# Patient Record
Sex: FEMALE | Race: WHITE | NOT HISPANIC OR LATINO | Employment: FULL TIME | URBAN - METROPOLITAN AREA
[De-identification: names, ages, dates, MRNs, and addresses within clinical notes are randomized per-mention and may not be internally consistent; named-entity substitution may affect disease eponyms.]

---

## 2022-09-19 ENCOUNTER — OFFICE VISIT (OUTPATIENT)
Dept: FAMILY MEDICINE CLINIC | Facility: CLINIC | Age: 42
End: 2022-09-19

## 2022-09-19 VITALS
WEIGHT: 206 LBS | HEIGHT: 63 IN | OXYGEN SATURATION: 100 % | DIASTOLIC BLOOD PRESSURE: 78 MMHG | BODY MASS INDEX: 36.5 KG/M2 | SYSTOLIC BLOOD PRESSURE: 124 MMHG | HEART RATE: 77 BPM

## 2022-09-19 DIAGNOSIS — Z00.00 PREVENTATIVE HEALTH CARE: ICD-10-CM

## 2022-09-19 DIAGNOSIS — Z12.31 ENCOUNTER FOR SCREENING MAMMOGRAM FOR MALIGNANT NEOPLASM OF BREAST: ICD-10-CM

## 2022-09-19 DIAGNOSIS — Z90.722 STATUS POST BILATERAL OOPHORECTOMY: ICD-10-CM

## 2022-09-19 DIAGNOSIS — R53.82 CHRONIC FATIGUE: ICD-10-CM

## 2022-09-19 DIAGNOSIS — Z11.59 ENCOUNTER FOR HEPATITIS C SCREENING TEST FOR LOW RISK PATIENT: ICD-10-CM

## 2022-09-19 DIAGNOSIS — Z98.84 HISTORY OF BARIATRIC SURGERY: ICD-10-CM

## 2022-09-19 DIAGNOSIS — Z13.228 SCREENING FOR METABOLIC DISORDER: ICD-10-CM

## 2022-09-19 DIAGNOSIS — D50.9 IRON DEFICIENCY ANEMIA, UNSPECIFIED IRON DEFICIENCY ANEMIA TYPE: Primary | ICD-10-CM

## 2022-09-19 DIAGNOSIS — Z13.220 SCREENING, LIPID: ICD-10-CM

## 2022-09-19 DIAGNOSIS — F32.A ANXIETY AND DEPRESSION: ICD-10-CM

## 2022-09-19 DIAGNOSIS — F41.9 ANXIETY AND DEPRESSION: ICD-10-CM

## 2022-09-19 DIAGNOSIS — E66.01 MORBID (SEVERE) OBESITY DUE TO EXCESS CALORIES: ICD-10-CM

## 2022-09-19 DIAGNOSIS — E66.9 OBESITY, CLASS II, BMI 35-39.9: ICD-10-CM

## 2022-09-19 DIAGNOSIS — R73.09 ELEVATED GLUCOSE: ICD-10-CM

## 2022-09-19 PROBLEM — E66.812 OBESITY, CLASS II, BMI 35-39.9: Status: ACTIVE | Noted: 2022-07-05

## 2022-09-19 PROCEDURE — 99386 PREV VISIT NEW AGE 40-64: CPT | Performed by: NURSE PRACTITIONER

## 2022-09-19 PROCEDURE — 90471 IMMUNIZATION ADMIN: CPT | Performed by: NURSE PRACTITIONER

## 2022-09-19 PROCEDURE — 99213 OFFICE O/P EST LOW 20 MIN: CPT | Performed by: NURSE PRACTITIONER

## 2022-09-19 PROCEDURE — 90715 TDAP VACCINE 7 YRS/> IM: CPT | Performed by: NURSE PRACTITIONER

## 2022-09-19 RX ORDER — TOPIRAMATE 50 MG/1
50 TABLET, FILM COATED ORAL EVERY 24 HOURS
COMMUNITY
Start: 2022-09-02 | End: 2022-12-27 | Stop reason: SDUPTHER

## 2022-09-19 RX ORDER — BUPROPION HYDROCHLORIDE 300 MG/1
300 TABLET ORAL EVERY MORNING
Qty: 30 TABLET | Refills: 6 | Status: SHIPPED | OUTPATIENT
Start: 2022-09-19 | End: 2022-12-27 | Stop reason: SDUPTHER

## 2022-09-19 RX ORDER — CYANOCOBALAMIN 1000 UG/ML
1000 INJECTION, SOLUTION INTRAMUSCULAR; SUBCUTANEOUS
Qty: 31 ML | Refills: 2 | Status: SHIPPED | OUTPATIENT
Start: 2022-09-19 | End: 2022-12-18

## 2022-09-19 RX ORDER — CYANOCOBALAMIN 1000 UG/ML
1 INJECTION, SOLUTION INTRAMUSCULAR; SUBCUTANEOUS
COMMUNITY
End: 2022-09-19 | Stop reason: SDUPTHER

## 2022-09-19 RX ORDER — DIPHENOXYLATE HYDROCHLORIDE AND ATROPINE SULFATE 2.5; .025 MG/1; MG/1
1 TABLET ORAL
COMMUNITY

## 2022-09-19 RX ORDER — BUPROPION HYDROCHLORIDE 300 MG/1
300 TABLET ORAL
COMMUNITY
End: 2022-09-19 | Stop reason: SDUPTHER

## 2022-09-19 NOTE — PROGRESS NOTES
Subjective   Leatha Burkett is a 41 y.o. female.     History of Present Illness   New patient to this provider and practice. Here to establish primary care. New to [place] from [place]. Pt is due annual exam, labs, needs referrals.     Patient presents with chronic iron def anemia, worse post bariatric surgery (20 years ago). She has low ferritin. She has h.o iron infusions and states she cannot absorb oral iron. She is needing hematology referral. She has worsening fatigue. Missed hematology appt in May since she moved.     She has h.o endometriosis and polycystic ovaries has had both ovaries removed. She was prev on HRT and she felt like this was causing pain and issues, stopped this < 1 year ago. She would like to see new GYN and have hormones checked.      Chronic PCOS and she has been on metformin 1000 mg qd. She is now without ovaries and unsure if she needs this medication. She tolerates this med well. She has recently gained weight and has h.o elevated glucose.     Chronic obesity. BMI 36. She has had bariatric surgery 20 years ago, goal weight is 150lb. She follows a healthy diet, limits sugar and fatty foods. She exercises as she is able but is having fatigue and has not been as active as she would like. She would like to see GYN regarding hormone levels after ovary removal.     Chronic anxiety and depression x years. She has been on wellbutrin 300 mg qd x 3 years., Denies SI/HI. PHQ score 1.     The following portions of the patient's history were reviewed and updated as appropriate: allergies, current medications, past family history, past medical history, past social history, past surgical history and problem list.    Review of Systems   Constitutional: Positive for fatigue and unexpected weight gain. Negative for activity change, diaphoresis and unexpected weight loss.   HENT: Negative for congestion and postnasal drip.         Dental exam is utd     Eyes: Negative for blurred vision and double vision.         Contacts, eye exam is utd     Respiratory: Negative for cough, chest tightness, shortness of breath and wheezing.    Cardiovascular: Negative for chest pain, palpitations and leg swelling.   Gastrointestinal: Positive for constipation. Negative for abdominal pain, blood in stool, diarrhea and GERD.   Endocrine: Negative for cold intolerance, heat intolerance, polydipsia, polyphagia and polyuria.   Genitourinary: Positive for pelvic pain (endometriosis). Negative for dysuria, frequency and pelvic pressure.   Musculoskeletal: Negative for arthralgias.   Allergic/Immunologic: Negative for environmental allergies.   Neurological: Negative for dizziness, seizures, weakness and headache.   Hematological: Does not bruise/bleed easily.   Psychiatric/Behavioral: Negative for sleep disturbance, suicidal ideas and depressed mood. The patient is nervous/anxious.        Objective   Physical Exam  Vitals reviewed.   Constitutional:       Appearance: Normal appearance. She is obese.   HENT:      Head: Normocephalic.      Right Ear: Tympanic membrane normal.      Left Ear: Tympanic membrane normal.      Nose: Nose normal.      Mouth/Throat:      Mouth: Mucous membranes are moist.   Eyes:      Pupils: Pupils are equal, round, and reactive to light.      Comments: Pallor of cunjunctiva bilat   Cardiovascular:      Rate and Rhythm: Normal rate and regular rhythm.      Pulses: Normal pulses.      Heart sounds: Normal heart sounds.   Pulmonary:      Effort: Pulmonary effort is normal.      Breath sounds: Normal breath sounds.   Abdominal:      General: Abdomen is flat.      Palpations: Abdomen is soft.   Musculoskeletal:         General: Normal range of motion.      Cervical back: Normal range of motion.   Skin:     General: Skin is warm.   Neurological:      General: No focal deficit present.      Mental Status: She is alert.   Psychiatric:         Mood and Affect: Mood is depressed.         Vitals:    09/19/22 0900   BP: 124/78    Pulse: 77   SpO2: 100%     Body mass index is 36.49 kg/m².    Procedures    Assessment & Plan   Problems Addressed this Visit        Endocrine and Metabolic    Obesity, Class II, BMI 35-39.9    Relevant Orders    Ambulatory Referral to Gynecology      Other Visit Diagnoses     Iron deficiency anemia, unspecified iron deficiency anemia type    -  Primary    Relevant Medications    cyanocobalamin 1000 MCG/ML injection    Other Relevant Orders    Iron    Vitamin B12 and Folate    Ferritin    CBC and Differential    Ambulatory Referral to Hematology    Preventative health care        Chronic fatigue        Relevant Orders    Comprehensive Metabolic Panel    TSH    Ambulatory Referral to Gynecology    Anxiety and depression        Relevant Medications    buPROPion XL (WELLBUTRIN XL) 300 MG 24 hr tablet    Screening, lipid        Relevant Orders    Lipid Panel With / Chol / HDL Ratio    Screening for metabolic disorder        Relevant Orders    Comprehensive Metabolic Panel    TSH    Hemoglobin A1c    Encounter for hepatitis C screening test for low risk patient        Relevant Orders    Hepatitis C Antibody    Morbid (severe) obesity due to excess calories (HCC)        Relevant Orders    Lipid Panel With / Chol / HDL Ratio    Elevated glucose        Relevant Orders    Hemoglobin A1c    Encounter for screening mammogram for malignant neoplasm of breast        Relevant Orders    Mammo Screening Digital Tomosynthesis Bilateral With CAD    History of bariatric surgery        Relevant Orders    Vitamin B12 and Folate    Ferritin    Status post bilateral oophorectomy          Diagnoses       Codes Comments    Iron deficiency anemia, unspecified iron deficiency anemia type    -  Primary ICD-10-CM: D50.9  ICD-9-CM: 280.9     Preventative health care     ICD-10-CM: Z00.00  ICD-9-CM: V70.0     Obesity, Class II, BMI 35-39.9     ICD-10-CM: E66.9  ICD-9-CM: 278.00     Chronic fatigue     ICD-10-CM: R53.82  ICD-9-CM: 780.79      Anxiety and depression     ICD-10-CM: F41.9, F32.A  ICD-9-CM: 300.00, 311     Screening, lipid     ICD-10-CM: Z13.220  ICD-9-CM: V77.91     Screening for metabolic disorder     ICD-10-CM: Z13.228  ICD-9-CM: V77.99     Encounter for hepatitis C screening test for low risk patient     ICD-10-CM: Z11.59  ICD-9-CM: V73.89     Morbid (severe) obesity due to excess calories (HCC)     ICD-10-CM: E66.01  ICD-9-CM: 278.01     Elevated glucose     ICD-10-CM: R73.09  ICD-9-CM: 790.29     Encounter for screening mammogram for malignant neoplasm of breast     ICD-10-CM: Z12.31  ICD-9-CM: V76.12     History of bariatric surgery     ICD-10-CM: Z98.84  ICD-9-CM: V45.86     Status post bilateral oophorectomy     ICD-10-CM: Z90.722  ICD-9-CM: V45.77           Preventative care- Follow heart healthy diet, drink water, walk daily. Wear seatbelts, wear helmets, wear sunscreens. Follow CDC guidelines for covid pandemic    Iron def anemia/ fatigue- refer hematology, check labs, enc high iron foods w vit C    Obesity- follow heart healthy diet, pair all carbs with protein. Increase activity. Limit sweets, fatty foods, highly refined carbs. Check labs.    Anxiety and depression-cont wellbutrin 300 qd, enc heart healthy diet, walking and try CALM dennise, look into therapy once she is settled.    S/P oophorectomy- refer GYN for labs and info on HRT      Additional time on acute concerns 22 min  Education provided in AVS   Return in about 6 months (around 3/19/2023) for Recheck.

## 2022-09-20 LAB
ALBUMIN SERPL-MCNC: 4.7 G/DL (ref 3.5–5.2)
ALBUMIN/GLOB SERPL: 2.4 G/DL
ALP SERPL-CCNC: 75 U/L (ref 39–117)
ALT SERPL-CCNC: 28 U/L (ref 1–33)
AST SERPL-CCNC: 24 U/L (ref 1–32)
BASOPHILS # BLD AUTO: 0.03 10*3/MM3 (ref 0–0.2)
BASOPHILS NFR BLD AUTO: 0.5 % (ref 0–1.5)
BILIRUB SERPL-MCNC: 0.4 MG/DL (ref 0–1.2)
BUN SERPL-MCNC: 13 MG/DL (ref 6–20)
BUN/CREAT SERPL: 22 (ref 7–25)
CALCIUM SERPL-MCNC: 9.5 MG/DL (ref 8.6–10.5)
CHLORIDE SERPL-SCNC: 103 MMOL/L (ref 98–107)
CHOLEST SERPL-MCNC: 146 MG/DL (ref 0–200)
CHOLEST/HDLC SERPL: 2.47 {RATIO}
CO2 SERPL-SCNC: 27.8 MMOL/L (ref 22–29)
CREAT SERPL-MCNC: 0.59 MG/DL (ref 0.57–1)
EGFRCR SERPLBLD CKD-EPI 2021: 115.6 ML/MIN/1.73
EOSINOPHIL # BLD AUTO: 0.08 10*3/MM3 (ref 0–0.4)
EOSINOPHIL NFR BLD AUTO: 1.4 % (ref 0.3–6.2)
ERYTHROCYTE [DISTWIDTH] IN BLOOD BY AUTOMATED COUNT: 11.4 % (ref 12.3–15.4)
FERRITIN SERPL-MCNC: 241 NG/ML (ref 13–150)
FOLATE SERPL-MCNC: >20 NG/ML (ref 4.78–24.2)
GLOBULIN SER CALC-MCNC: 2 GM/DL
GLUCOSE SERPL-MCNC: 104 MG/DL (ref 65–99)
HBA1C MFR BLD: 5.5 % (ref 4.8–5.6)
HCT VFR BLD AUTO: 43.5 % (ref 34–46.6)
HCV AB S/CO SERPL IA: <0.1 S/CO RATIO (ref 0–0.9)
HDLC SERPL-MCNC: 59 MG/DL (ref 40–60)
HGB BLD-MCNC: 14.4 G/DL (ref 12–15.9)
IMM GRANULOCYTES # BLD AUTO: 0.02 10*3/MM3 (ref 0–0.05)
IMM GRANULOCYTES NFR BLD AUTO: 0.3 % (ref 0–0.5)
IRON SERPL-MCNC: 104 MCG/DL (ref 37–145)
LDLC SERPL CALC-MCNC: 69 MG/DL (ref 0–100)
LYMPHOCYTES # BLD AUTO: 1.79 10*3/MM3 (ref 0.7–3.1)
LYMPHOCYTES NFR BLD AUTO: 30.2 % (ref 19.6–45.3)
MCH RBC QN AUTO: 30.3 PG (ref 26.6–33)
MCHC RBC AUTO-ENTMCNC: 33.1 G/DL (ref 31.5–35.7)
MCV RBC AUTO: 91.6 FL (ref 79–97)
MONOCYTES # BLD AUTO: 0.58 10*3/MM3 (ref 0.1–0.9)
MONOCYTES NFR BLD AUTO: 9.8 % (ref 5–12)
NEUTROPHILS # BLD AUTO: 3.42 10*3/MM3 (ref 1.7–7)
NEUTROPHILS NFR BLD AUTO: 57.8 % (ref 42.7–76)
NRBC BLD AUTO-RTO: 0 /100 WBC (ref 0–0.2)
PLATELET # BLD AUTO: 264 10*3/MM3 (ref 140–450)
POTASSIUM SERPL-SCNC: 4.2 MMOL/L (ref 3.5–5.2)
PROT SERPL-MCNC: 6.7 G/DL (ref 6–8.5)
RBC # BLD AUTO: 4.75 10*6/MM3 (ref 3.77–5.28)
SODIUM SERPL-SCNC: 139 MMOL/L (ref 136–145)
TRIGL SERPL-MCNC: 97 MG/DL (ref 0–150)
TSH SERPL DL<=0.005 MIU/L-ACNC: 0.29 UIU/ML (ref 0.27–4.2)
VIT B12 SERPL-MCNC: 819 PG/ML (ref 211–946)
VLDLC SERPL CALC-MCNC: 18 MG/DL (ref 5–40)
WBC # BLD AUTO: 5.92 10*3/MM3 (ref 3.4–10.8)

## 2022-09-22 ENCOUNTER — PATIENT ROUNDING (BHMG ONLY) (OUTPATIENT)
Dept: FAMILY MEDICINE CLINIC | Facility: CLINIC | Age: 42
End: 2022-09-22

## 2022-09-22 NOTE — PROGRESS NOTES
A [employer] message has been sent to the patient for PATIENT ROUNDING with [facility].

## 2022-11-01 NOTE — PROGRESS NOTES
.     REASON FOR CONSULTATION:   Iron deficiency anemia  Provide an opinion on any further workup or treatment                             REQUESTING PHYSICIAN: MESERET Smith  RECORDS OBTAINED:  Records of the patient's history including those obtained from the referring provider were reviewed and summarized in detail.    HISTORY OF PRESENT ILLNESS:  The patient is a 41 y.o. year old female  who is here for follow-up with the above-mentioned history.    Reviewed last PCP note, 9/19/2022.  Patient recently moved to [place] from [place].  Chronic iron deficiency anemia.  Worse after bariatric surgery around 2002.  Low ferritin.  History of iron IV.  History of endometriosis and polycystic ovaries.  Both ovaries removed.  She was referred to us for the iron deficiency anemia.    Reviewed last [place] hematology note, Dr. Huerta, 1/25/2022.    Denies vaginal bleeding since the second ovary was removed, November 2021.  No bleeding elsewhere.    No chest pain or SOA.    Past Medical History:   Diagnosis Date   • Anemia 2016   • Anxiety    • Bariatric surgery status    • Depression    • Essential hypertension, benign    • GERD (gastroesophageal reflux disease)    • Obesity    • МАРИНА (obstructive sleep apnea)    • PCOS (polycystic ovarian syndrome)      Past Surgical History:   Procedure Laterality Date   • BARIATRIC SURGERY  2003   • COLONOSCOPY  2022   • DILATATION AND CURETTAGE  1999   • ENDOSCOPY  2022   • GASTRIC BYPASS  2003   • OOPHORECTOMY Right 2004   • OOPHORECTOMY  2021   • OVARY SURGERY Bilateral     right removed 2006  left removed 2021       MEDICATIONS    Current Outpatient Medications:   •  cyanocobalamin 1000 MCG/ML injection, Inject 1 mL under the skin into the appropriate area as directed Every 30 (Thirty) Days for 90 days., Disp: 31 mL, Rfl: 2  •  multivitamin (THERAGRAN) tablet tablet, Take 1 each by mouth., Disp: , Rfl:   •  topiramate (TOPAMAX) 50 MG tablet, Take 50 mg by mouth Daily., Disp: ,  Rfl:   •  buPROPion XL (WELLBUTRIN XL) 300 MG 24 hr tablet, Take 1 tablet by mouth Every Morning for 30 days., Disp: 30 tablet, Rfl: 6  •  metFORMIN (GLUCOPHAGE) 1000 MG tablet, Take 1 tablet by mouth Daily With Breakfast., Disp: 90 tablet, Rfl: 1    ALLERGIES:   No Known Allergies    SOCIAL HISTORY:       Social History     Socioeconomic History   • Marital status:      Spouse name: Galilea   • Number of children: 2   Tobacco Use   • Smoking status: Never   • Smokeless tobacco: Never   Vaping Use   • Vaping Use: Never used   Substance and Sexual Activity   • Alcohol use: Not Currently   • Drug use: Never   • Sexual activity: Yes     Partners: Female         FAMILY HISTORY:  Family History   Problem Relation Age of Onset   • Hypertension Mother    • Hyperlipidemia Mother    • Cancer Father         Pancreatic   • Diabetes Father    • Heart disease Father    • Pancreatic cancer Father    • Sleep apnea Father    • Diabetes Sister    • Heart disease Sister         Heart attack 50s   • Heart attack Sister    • Cancer Brother         Lung/smoker   • Early death Brother    • Lung cancer Brother    • Cancer Maternal Grandmother         Colon (did not cause death)   • Cancer Maternal Grandfather         Colon   • Stroke Paternal Grandmother    • Heart attack Paternal Grandfather        REVIEW OF SYSTEMS:  Review of Systems   Constitutional: Negative for activity change.   HENT: Negative for nosebleeds and trouble swallowing.    Respiratory: Negative for shortness of breath and wheezing.    Cardiovascular: Negative for chest pain and palpitations.   Gastrointestinal: Negative for constipation, diarrhea and nausea.   Genitourinary: Negative for dysuria and hematuria.   Musculoskeletal: Negative for arthralgias and myalgias.   Skin: Negative for rash and wound.   Neurological: Negative for seizures and syncope.   Hematological: Negative for adenopathy. Does not bruise/bleed easily.   Psychiatric/Behavioral: Negative for  "confusion.              Vitals:    11/02/22 1044   BP: 115/85   Pulse: 79   Resp: 16   Temp: 97.5 °F (36.4 °C)   TempSrc: Temporal   SpO2: 98%   Weight: 95.6 kg (210 lb 11.2 oz)   Height: 160 cm (62.99\")   PainSc: 0-No pain     Current Status 11/2/2022   ECOG score 0      PHYSICAL EXAM:        CONSTITUTIONAL:  Vital signs reviewed.  No distress, looks comfortable.  EYES:  Conjunctiva and lids unremarkable.  PERRLA  EARS,NOSE,MOUTH,THROAT:  Ears and nose appear unremarkable.  Lips, teeth, gums appear unremarkable.  RESPIRATORY:  Normal respiratory effort.  Lungs clear to auscultation bilaterally.  CARDIOVASCULAR:  Normal S1, S2.  No murmurs rubs or gallops.  No significant lower extremity edema.  GASTROINTESTINAL: Abdomen appears unremarkable.  Nontender.  No hepatomegaly.  No splenomegaly.  LYMPHATIC:  No cervical, supraclavicular, axillary lymphadenopathy.  SKIN:  Warm.  No rashes.  PSYCHIATRIC:  Normal judgment and insight.  Normal mood and affect.          RECENT LABS:        WBC   Date Value Ref Range Status   11/02/2022 7.02 3.40 - 10.80 10*3/mm3 Final   09/19/2022 5.92 3.40 - 10.80 10*3/mm3 Final     Hemoglobin   Date Value Ref Range Status   11/02/2022 14.8 12.0 - 15.9 g/dL Final   09/19/2022 14.4 12.0 - 15.9 g/dL Final     Platelets   Date Value Ref Range Status   11/02/2022 248 140 - 450 10*3/mm3 Final   09/19/2022 264 140 - 450 10*3/mm3 Final       Assessment & Plan   Anemia, unspecified type  - Ferritin  - Iron Profile  - Retic With IRF & RET-He  - CBC & Differential  - Folate RBC  - Vitamin B12        Leatha Burkett   *Iron deficiency anemia  · History of IV iron, which typically improves body cramps/aches/fatigue  · 11/2/2022: Transferred care to our practice after moving to [place] from [place].  Previously under the care of Dr. Marquise Huerta, hematology oncology at [facility] in [place], [place].  He reported she was needing IV iron on average annually  · Cannot tolerate oral iron due to " constipation and abdominal pain.  Also oral iron is not effective due to poor absorption (has not been effective in the past and she has gastric bypass, which is felt to decrease absorption)  · 9/19/2022: Ferritin 241, serum iron 104 (normal iron saturation).  Hb 14.4.  MCV 91.6.  No need for IV iron at this time.    *Source of iron deficiency  · Gastric bypass 2003, [facility]  · History of menorrhagia.  First ovary removed 2006.  Second ovary removed November 2021.  (Ovaries were removed due to cysts.)    · No vaginal bleeding since then.  · Patient's last EGD and colonoscopy April 2021.  She states she was told to have the next 5 years later.    *B12 deficiency, since gastric bypass surgery.  · Monthly IM B12 injections at home  · B12 819 on 9/19/2022.    *Gastric bypass surgery, 2003 (lost 150 pounds).    *Family history of colon cancer  · Mom age 48, maternal grandmother age 45, maternal grandfather age 68.  · Patient's last EGD and colonoscopy April 2021.  She states she was told to have the next 5 years later.  · She declines genetic counseling but knows it is available    Plan  · MD 6 months.  1 week prior: Ferritin, iron panel, CBC, reticulate hemoglobin, B12 level, RBC folate  · (had also been getting folate checked).  · Vitamin B12 1000 mcg IM monthly prescriptions from our office.  3 months at a time, 3 refills

## 2022-11-02 ENCOUNTER — CONSULT (OUTPATIENT)
Dept: ONCOLOGY | Facility: CLINIC | Age: 42
End: 2022-11-02

## 2022-11-02 ENCOUNTER — LAB (OUTPATIENT)
Dept: LAB | Facility: HOSPITAL | Age: 42
End: 2022-11-02

## 2022-11-02 VITALS
SYSTOLIC BLOOD PRESSURE: 115 MMHG | OXYGEN SATURATION: 98 % | RESPIRATION RATE: 16 BRPM | HEART RATE: 79 BPM | BODY MASS INDEX: 37.33 KG/M2 | WEIGHT: 210.7 LBS | TEMPERATURE: 97.5 F | HEIGHT: 63 IN | DIASTOLIC BLOOD PRESSURE: 85 MMHG

## 2022-11-02 DIAGNOSIS — R79.89 ABNORMAL CBC: Primary | ICD-10-CM

## 2022-11-02 DIAGNOSIS — D64.9 ANEMIA, UNSPECIFIED TYPE: Primary | ICD-10-CM

## 2022-11-02 LAB
BASOPHILS # BLD AUTO: 0.05 10*3/MM3 (ref 0–0.2)
BASOPHILS NFR BLD AUTO: 0.7 % (ref 0–1.5)
DEPRECATED RDW RBC AUTO: 40.2 FL (ref 37–54)
EOSINOPHIL # BLD AUTO: 0.07 10*3/MM3 (ref 0–0.4)
EOSINOPHIL NFR BLD AUTO: 1 % (ref 0.3–6.2)
ERYTHROCYTE [DISTWIDTH] IN BLOOD BY AUTOMATED COUNT: 12 % (ref 12.3–15.4)
HCT VFR BLD AUTO: 44 % (ref 34–46.6)
HGB BLD-MCNC: 14.8 G/DL (ref 12–15.9)
IMM GRANULOCYTES # BLD AUTO: 0.03 10*3/MM3 (ref 0–0.05)
IMM GRANULOCYTES NFR BLD AUTO: 0.4 % (ref 0–0.5)
LYMPHOCYTES # BLD AUTO: 1.87 10*3/MM3 (ref 0.7–3.1)
LYMPHOCYTES NFR BLD AUTO: 26.6 % (ref 19.6–45.3)
MCH RBC QN AUTO: 30.6 PG (ref 26.6–33)
MCHC RBC AUTO-ENTMCNC: 33.6 G/DL (ref 31.5–35.7)
MCV RBC AUTO: 91.1 FL (ref 79–97)
MONOCYTES # BLD AUTO: 0.64 10*3/MM3 (ref 0.1–0.9)
MONOCYTES NFR BLD AUTO: 9.1 % (ref 5–12)
NEUTROPHILS NFR BLD AUTO: 4.36 10*3/MM3 (ref 1.7–7)
NEUTROPHILS NFR BLD AUTO: 62.2 % (ref 42.7–76)
NRBC BLD AUTO-RTO: 0 /100 WBC (ref 0–0.2)
PLATELET # BLD AUTO: 248 10*3/MM3 (ref 140–450)
PMV BLD AUTO: 10.1 FL (ref 6–12)
RBC # BLD AUTO: 4.83 10*6/MM3 (ref 3.77–5.28)
WBC NRBC COR # BLD: 7.02 10*3/MM3 (ref 3.4–10.8)

## 2022-11-02 PROCEDURE — 99204 OFFICE O/P NEW MOD 45 MIN: CPT | Performed by: INTERNAL MEDICINE

## 2022-11-02 PROCEDURE — 36415 COLL VENOUS BLD VENIPUNCTURE: CPT

## 2022-11-02 PROCEDURE — 85025 COMPLETE CBC W/AUTO DIFF WBC: CPT

## 2022-11-04 ENCOUNTER — TELEPHONE (OUTPATIENT)
Dept: ONCOLOGY | Facility: CLINIC | Age: 42
End: 2022-11-04

## 2022-11-04 NOTE — TELEPHONE ENCOUNTER
----- Message from Ramon Butts II, MD sent at 11/2/2022  5:28 PM EDT -----  Hi April,    Please prescribe  Vitamin B12 1000 mcg IM monthly.  3 months at a time, 3 refills    Thanks!

## 2022-11-04 NOTE — TELEPHONE ENCOUNTER
Left message on vm for pt to return my call if she needs the Vit B12 script sent as she has a script listed in her chart from another provider.

## 2022-11-30 ENCOUNTER — OFFICE VISIT (OUTPATIENT)
Dept: FAMILY MEDICINE CLINIC | Facility: CLINIC | Age: 42
End: 2022-11-30

## 2022-11-30 VITALS
HEART RATE: 76 BPM | HEIGHT: 63 IN | RESPIRATION RATE: 18 BRPM | OXYGEN SATURATION: 100 % | WEIGHT: 218 LBS | BODY MASS INDEX: 38.62 KG/M2 | DIASTOLIC BLOOD PRESSURE: 80 MMHG | SYSTOLIC BLOOD PRESSURE: 110 MMHG

## 2022-11-30 DIAGNOSIS — Z86.14 HISTORY OF MRSA INFECTION: ICD-10-CM

## 2022-11-30 DIAGNOSIS — R22.0 SWELLING OF NOSE: Primary | ICD-10-CM

## 2022-11-30 PROCEDURE — 99213 OFFICE O/P EST LOW 20 MIN: CPT | Performed by: NURSE PRACTITIONER

## 2022-11-30 NOTE — PROGRESS NOTES
Subjective   Leatha Burkett is a 41 y.o. female.     History of Present Illness   Established patient. Here for nasal swelling and pain    Patient developed small bump in Left nare x 1 week. She thought this was a pimple. Has become swollen and tender. No fever. Pt has h.o MRSA.  She is worried swelling under top lip at top of gums.     The following portions of the patient's history were reviewed and updated as appropriate: allergies, current medications, past family history, past medical history, past social history, past surgical history and problem list.    Review of Systems    Objective   Physical Exam  Vitals reviewed.   Constitutional:       Appearance: Normal appearance.   HENT:      Head: Normocephalic.      Right Ear: Tympanic membrane normal.      Left Ear: Tympanic membrane normal.      Nose:        Comments: Generalized swelling of L nare. Small pustule      Mouth/Throat:      Lips: Pink. No lesions.      Mouth: Mucous membranes are moist.   Cardiovascular:      Rate and Rhythm: Normal rate and regular rhythm.      Pulses: Normal pulses.      Heart sounds: Normal heart sounds.   Pulmonary:      Effort: Pulmonary effort is normal.      Breath sounds: Normal breath sounds.   Musculoskeletal:      Cervical back: Normal range of motion.   Lymphadenopathy:      Cervical: No cervical adenopathy.   Skin:     General: Skin is warm.   Neurological:      General: No focal deficit present.      Mental Status: She is alert.         Vitals:    11/30/22 1604   BP: 110/80   Pulse: 76   Resp: 18   SpO2: 100%     Body mass index is 38.63 kg/m².    Procedures    Assessment & Plan   Problems Addressed this Visit    None  Visit Diagnoses     Swelling of nose    -  Primary    History of MRSA infection          Diagnoses       Codes Comments    Swelling of nose    -  Primary ICD-10-CM: R22.0  ICD-9-CM: 784.2     History of MRSA infection     ICD-10-CM: Z86.14  ICD-9-CM: V12.04         Nasal swelling/h.o MRSA- apply new rx  mupirocin 3 x day x 5 days , call if worsening pain, swelling, fever or concerns  Reviewed handwashing and use bleach based  on surfaces and dial soap           Education provided in AVS   Return if symptoms worsen or fail to improve.

## 2022-12-01 ENCOUNTER — TELEPHONE (OUTPATIENT)
Dept: FAMILY MEDICINE CLINIC | Facility: CLINIC | Age: 42
End: 2022-12-01

## 2022-12-01 RX ORDER — DOXYCYCLINE HYCLATE 100 MG/1
100 CAPSULE ORAL 2 TIMES DAILY
Qty: 10 CAPSULE | Refills: 0 | Status: SHIPPED | OUTPATIENT
Start: 2022-12-01 | End: 2022-12-06

## 2022-12-01 NOTE — TELEPHONE ENCOUNTER
[facility] [phone]  We gave patient Rx and they need it changed to something less ewpensive.

## 2022-12-01 NOTE — TELEPHONE ENCOUNTER
If bactroban is too expensive will need oral doxycyclien 100 bid x 5 days, take w food, can apply triple antibiotic ointment to nose additionally

## 2022-12-09 DIAGNOSIS — R22.0 NASAL SWELLING: Primary | ICD-10-CM

## 2022-12-09 DIAGNOSIS — Z86.14 HISTORY OF MRSA INFECTION: ICD-10-CM

## 2022-12-25 NOTE — PROGRESS NOTES
.  Subjective     Chief Complaint   Patient presents with   • Follow-up     New GYN, Referral for Chronic fatigue & obesity, Last Pap pt states was 2022 normal,  Last Mammogram 2022 normal       History of Present Illness    Leatha Burkett is a 42 y.o.  who is scheduled for new pt visit and hormone consult.   She declines annual exam noting she had this with her prior gyn. She notes she is here regarding hormone therapy. She has had both ovaries removed. She had her right ovary removed  due to a cyst. She then had her left ovary removed  due to recurrent cysts. She notes she also had a ovarian remnant on the right that was removed.   She notes she has been taking hormone replacement therapy. She has been taking estrace orally. She notes her prior gyn also placed her on progesterone to take every 3 months but she has not taken this in about 7 months as she did not have a refill.     She notes she recently moved to [place] with her wife. They have 2 teenage children going to [place]. They like things so far. 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 Her partner works for [employer].     Obstetric History:  OB History        3    Para   2    Term   1       1    AB   1    Living   2       SAB   1    IAB        Ectopic        Molar        Multiple        Live Births   2          Obstetric Comments   Hx incompetent cervix, had cerclage with last 2 pregnancies            Menstrual History:     No LMP recorded (lmp unknown). (Menstrual status: Other).         Current contraception: has had both ovaries removed, also female partner  History of abnormal Pap smear: no  Received Gardasil immunization: no  Perform regular self breast exam: yes  Family history of uterine or ovarian cancer: pat great aunt-ovarian cancer  Family History of colon cancer: MGF  Family history of breast cancer: no    Mammogram: pt notes she had last 2022 before moveing  Colonoscopy: up to date per  "pt, done last spring  DEXA: not indicated.    Exercise: moderately active  Calcium/Vitamin D: adequate intake    The following portions of the patient's history were reviewed and updated as appropriate: allergies, current medications, past family history, past medical history, past social history, past surgical history and problem list.    Review of Systems    Review of Systems   Constitutional: Positive for fatigue.   Respiratory: Negative for shortness of breath.  .   Genitourinary: Negative for vaginal bleeding  Neurological: Negative for headaches.   Psychiatric/Behavioral: Negative for dysphoric mood.         Objective   Physical Exam    /88   Ht 160 cm (62.99\")   Wt 97.5 kg (215 lb)   LMP  (LMP Unknown)   BMI 38.10 kg/m²   General:   Alert, in no distress       Lungs: Normal respiratory effort                       Neurologic: Alert and oriented   Psychiatric: Normal affect, judgment and mood     Assessment & Plan   Diagnoses and all orders for this visit:    1. Surgical menopause (Primary)    2. Ashkenazi Buddhist ancestry requiring population-specific genetic screening  -     Ambulatory Referral to Genetic Counseling/Testing    Other orders  -     medroxyPROGESTERone (Provera) 10 MG tablet; Take 1 tablet by mouth Daily. For 12 days per month  Dispense: 12 tablet; Refill: 2        All questions answered.    Recommend pt stop estrace at this time and take 2, 12 day courses of provera monthly to stimulate withdrawal bleeding. Discussed importance of progestin therapy with ERT to prevent endometrial hyperplasia and cancer. Discussed risks of combined HRT to include breast cancer, DVT/PE and CVE. Pt notes understanding. Also discussed hx of remnant and possible residual ovarian tissue may be present. Suggest she observe off HRT and check FSH in several months. Pt notes she had significant hot flashes after surgery and unsure if she would tolerate coming off HRT long term. She will consider. Recommend she " sign for records from prior GYN regarding surgical procedures and pathology. Also need pap and mammogram records. Pt noted she believes her annual may be due spring but reports she is currently up to date.     Reviewed family hx of cancer. Pt also notes her father's family is of Ashkenazi inheritance but she has not received genetic counseling regarding related risks. Recommended screening for cancer genes as well as consult with genetic counselor for formal discussion regarding her history. She agrees. Advised her to call if she does not receive contact to book within 2 weeks.     Recommend f/u visit 8 weeks to assess symptoms and withdrawal bleeding and review records. Pt notes agreement.     31 min spent with pt reviewing hx and providing counseling

## 2022-12-27 ENCOUNTER — OFFICE VISIT (OUTPATIENT)
Dept: OBSTETRICS AND GYNECOLOGY | Age: 42
End: 2022-12-27

## 2022-12-27 VITALS
WEIGHT: 215 LBS | SYSTOLIC BLOOD PRESSURE: 126 MMHG | DIASTOLIC BLOOD PRESSURE: 88 MMHG | BODY MASS INDEX: 38.09 KG/M2 | HEIGHT: 63 IN

## 2022-12-27 DIAGNOSIS — Z13.79 ASHKENAZI JEWISH ANCESTRY REQUIRING POPULATION-SPECIFIC GENETIC SCREENING: ICD-10-CM

## 2022-12-27 DIAGNOSIS — E89.40 SURGICAL MENOPAUSE: Primary | ICD-10-CM

## 2022-12-27 PROBLEM — Z98.84 S/P GASTRIC BYPASS: Status: ACTIVE | Noted: 2022-12-06

## 2022-12-27 PROCEDURE — 99203 OFFICE O/P NEW LOW 30 MIN: CPT | Performed by: OBSTETRICS & GYNECOLOGY

## 2022-12-27 RX ORDER — BUPROPION HYDROCHLORIDE 100 MG/1
TABLET, EXTENDED RELEASE ORAL
COMMUNITY
Start: 2022-12-06 | End: 2023-02-24

## 2022-12-27 RX ORDER — MEDROXYPROGESTERONE ACETATE 10 MG/1
10 TABLET ORAL DAILY
Qty: 12 TABLET | Refills: 2 | Status: SHIPPED | OUTPATIENT
Start: 2022-12-27

## 2022-12-27 RX ORDER — SUCRALFATE 1 G/1
TABLET ORAL
COMMUNITY
Start: 2022-12-06

## 2022-12-27 RX ORDER — ESTRADIOL 0.5 MG/1
TABLET ORAL
COMMUNITY
Start: 2022-12-16 | End: 2022-12-27

## 2022-12-27 RX ORDER — OMEPRAZOLE 40 MG/1
CAPSULE, DELAYED RELEASE ORAL
COMMUNITY
Start: 2022-12-12

## 2022-12-29 ENCOUNTER — PATIENT ROUNDING (BHMG ONLY) (OUTPATIENT)
Dept: OBSTETRICS AND GYNECOLOGY | Age: 42
End: 2022-12-29

## 2022-12-29 NOTE — PROGRESS NOTES
A MY CHART MESSAGE HAS BEEN SENT TO THE PATIENT FOR [facility] ROUNDING.

## 2023-01-11 ENCOUNTER — TELEPHONE (OUTPATIENT)
Dept: GENETICS | Facility: HOSPITAL | Age: 43
End: 2023-01-11
Payer: COMMERCIAL

## 2023-01-11 NOTE — TELEPHONE ENCOUNTER
Left message reminding patient of genetic appt on Monday. Asked patient to call me back to review family history prior to that appt.

## 2023-01-16 ENCOUNTER — CLINICAL SUPPORT (OUTPATIENT)
Dept: GENETICS | Facility: HOSPITAL | Age: 43
End: 2023-01-16
Payer: COMMERCIAL

## 2023-01-16 DIAGNOSIS — Z80.0 FAMILY HISTORY OF PANCREATIC CANCER: ICD-10-CM

## 2023-01-16 DIAGNOSIS — Z13.79 GENETIC TESTING: Primary | ICD-10-CM

## 2023-01-16 DIAGNOSIS — Z80.0 FAMILY HISTORY OF COLON CANCER: ICD-10-CM

## 2023-01-16 PROCEDURE — 96040: CPT | Performed by: GENETIC COUNSELOR, MS

## 2023-01-16 NOTE — PROGRESS NOTES
Leatha Burkett, a 42-year-old female, was referred for genetic counseling due to a family history of breast cancer. Ms. Burkett has no personal history of cancer.  She was 8 years old at menarche and had her first child at age 23. She went through menopause at age 41 when she had both ovaries removed due to ovarian cysts. She retains her uterus. Ms. Burkett’s most recent mammogram was in April 2022 and was normal. She does have a history of a breast biopsy that was benign. Ms. Burkett’s most recent colonoscopy was in April 2022.  She reports no history of colon polyps.  She was interested in discussing her risk for a hereditary cancer syndrome.   Ms. Burkett decided to pursue comprehensive genetic testing to evaluate her risk of cancer, therefore the CancerNext Expanded Panel was ordered through [employer] which analyzes 77 genes associated with an increased cancer risk. Ms. Burkett was given a saliva kit to take home as we were uncertain about insurance coverage for the blood draw at [facility].  Results are expected in 2-3 weeks after [employer] has received the sample.      PERTINENT FAMILY HISTORY: (See attached pedigree)   Father:   Pancreatic cancer, 60  Mat Uncle 1:  Colon cancer, 60  Mat Uncle 2:  Leukemia, 43  Mat Aunt:  Colon cancer, 70s  Mat Grandmother: Colon cancer, 30s/40s  Mat Grandfather: Colon cancer, 60s    Ms. Burkett also has Ashkenazi Mu-ism ancestry on her paternal side.  We do not have medical records to review regarding any of these diagnoses.       RISK ASSESSMENT:  Ms. Burkett’s family history of breast cancer raises the question of a hereditary cancer syndrome.  NCCN guidelines for genetic testing for BRCA1/2 states that individuals with a first-degree relative with pancreatic cancer at any age may consider genetic testing. With Ms. Burkett’s father’s diagnosis, she would clearly meet this criteria. She also meets criteria for Mi syndrome testing with her maternal history of colon cancer. This risk assessment is  based on the family history information provided at the time of the appointment.  The assessment could change in the future should new information be obtained.    GENETIC COUNSELING (30 minutes):  We reviewed the family history information in detail. Cases of cancer follow three general patterns: sporadic, familial, and hereditary.  While most cancer is sporadic, some cases appear to occur in family clusters.  These cases are said to be familial and account for 10-20% of cancer cases.  Familial cases may be due to a combination of shared genes and environmental factors among family members.  In even fewer cases, the risk for cancer is inherited, and the genes responsible for the increased cancer risk are known.       Family histories typical of hereditary cancer syndromes usually include multiple first- and second-degree relatives diagnosed with cancer types that define a syndrome.  These cases tend to be diagnosed at younger-than-expected ages and can be bilateral or multifocal.  The cancer in these families follows an autosomal dominant inheritance pattern, which indicates the likely presence of a mutation in a cancer susceptibility gene.  Children and siblings of an individual believed to carry this mutation have a 50% chance of inheriting that mutation, thereby inheriting the increased risk to develop cancer.  These mutations can be passed down from the maternal or the paternal lineage.     Due to Ms. Burkett’s family history of breast cancer, we discussed hereditary breast cancer. Hereditary breast cancer accounts for 5-10% of all cases of breast cancer.  A significant proportion (50%) of hereditary breast cancer can be attributed to mutations in the BRCA1 and BRCA2 genes.  Mutations in these genes confer an increased risk for breast cancer, ovarian cancer, male breast cancer, prostate cancer, and pancreatic cancer.  Women with a BRCA1 or BRCA2 mutation have up to an 87% lifetime risk of breast cancer and up to a  44% risk of ovarian cancer.  There are other clinically significant breast cancer related genes in addition to BRCA1/2, including PALB2, SG, and CHEK2.     We also discussed that Mi syndrome is the most common hereditary colon cancer syndrome. It is an autosomal dominant condition caused by mutations in mismatch repair genes (MLH1, MSH2, MSH6, PMS2, and EPCAM).  The lifetime risk for colon cancer for individuals with Mi syndrome is up to 80% if there is no intervention (i.e. removal of polyps detected on colonoscopy).  Routine screening colonoscopy has been shown to reduce the incidence and mortality of colon cancer in Mi syndrome families by as much as 65%.  Other risks include cancer of the endometrium/uterus, ovary, stomach, urinary tract, small intestines, biliary tract, and brain.      There are other hereditary cancer syndromes as well. Based on Ms. Burkett’s family history and her desire to get more information regarding her personal risks, testing was pursued through a multigene panel evaluating several other genes known to increase the risk for cancer.     GENETIC TESTING:  The risks, benefits, and limitations of genetic testing and implications for clinical management following testing were reviewed.  DNA test results can influence decisions regarding screening and prevention.  Genetic testing can have significant psychological implications for both individuals and families. Also discussed was the possibility of employment and insurance discrimination based on genetic test results and the laws in place to prevent this, as well as the limitations of these laws.       We discussed panel testing, which would involve testing 77 genes associated with increased cancer risk. The implications of a positive or negative test result were discussed.  We also discussed the importance of testing an affected relative and how a negative result for Ms. Burkett wouldn’t necessarily mean the cancers presenting in her  family weren’t due to a genetic mutation that Ms. Burkett did not inherit.  In general, a negative genetic test result is most informative if a mutation has first been established in an affected member of the family.  In cases where an affected individual is not available or interested in testing, it is appropriate to offer testing to an unaffected individual. We discussed the possibility that, in some cases, genetic test results may be ambiguous due to the identification of a genetic variant. These variants may or may not be associated with an increased cancer risk. With multigene panel testing, it is not uncommon for a variant of uncertain significance (VUS) to be identified.  If a VUS is identified, testing family members is typically not recommended and screening recommendations are made based on the family history.  The laboratories that perform genetic testing work to reclassify the VUS and send out an amended report if and when a VUS is reclassified.  The majority of variant findings are ultimately reclassified to a negative result. Given Ms. Burkett’s family history, a negative test result does not eliminate all cancer risk, although the risk would not be as high as it would with positive genetic testing.     PLAN: Genetic testing was ordered via the CancerNext Expanded Panel through [employer]. Ms. Burkett was given a saliva kit to take home.  Results are expected in 2-3 weeks after [employer] receives the sample and will be called out to Ms. Burkett. If she has any questions in the meantime, she is welcome to call me at [phone].      Olamide Wilder MS, [facility] [place] [facility]  Licensed Certified Genetic Counselor

## 2023-01-26 ENCOUNTER — DOCUMENTATION (OUTPATIENT)
Dept: GENETICS | Facility: HOSPITAL | Age: 43
End: 2023-01-26
Payer: COMMERCIAL

## 2023-01-26 NOTE — PROGRESS NOTES
Leatha Burkett, a 42-year-old female, was referred for genetic counseling due to a family history of breast cancer. Ms. Burkett has no personal history of cancer.  She was 8 years old at menarche and had her first child at age 23. She went through menopause at age 41 when she had both ovaries removed due to ovarian cysts. She retains her uterus. Ms. Burkett’s most recent mammogram was in April 2022 and was normal. She does have a history of a breast biopsy that was benign. Ms. Burkett’s most recent colonoscopy was in April 2022.  She reports no history of colon polyps.  She was interested in discussing her risk for a hereditary cancer syndrome.   Ms. Burkett decided to pursue comprehensive genetic testing to evaluate her risk of cancer, therefore the CancerNext Expanded Panel was ordered through [employer] which analyzes 77 genes associated with an increased cancer risk. Genetic testing was negative for known pathogenic (harmful) mutations in BRCA1/2 and 75 additional genes included on this panel. While no known pathogenic mutations were identified, a variant of uncertain significance (VUS) was identified in the FANCC gene.  Variants are changes in DNA that may or may not affect the function of the gene.  The classification of a variant to either a benign gene change or pathogenic mutation depends on a number of factors.  The majority (estimated to be >90%) of VUS’s are eventually reclassified as benign gene changes. It is not recommended that any unaffected relatives be tested for this variant at this time, and management should not be altered based on this variant.  Management should be guided by personal and family history assessments.  These results were discussed with Ms. Burkett by telephone on 01/26/2023.     PERTINENT FAMILY HISTORY: (See attached pedigree)   Father:   Pancreatic cancer, 60  Mat Uncle 1:  Colon cancer, 60  Mat Uncle 2:  Leukemia, 43  Mat Aunt:  Colon cancer, 70s  Mat Grandmother: Colon cancer,  30s/40s  Mat Grandfather: Colon cancer, 60s    Ms. Burkett also has Ashkenazi Pentecostalism ancestry on her paternal side.  We do not have medical records to review regarding any of these diagnoses.       RISK ASSESSMENT:  Ms. Burkett’s family history of breast cancer raises the question of a hereditary cancer syndrome.  NCCN guidelines for genetic testing for BRCA1/2 states that individuals with a first-degree relative with pancreatic cancer at any age may consider genetic testing. With Ms. Burkett’s father’s diagnosis, she would clearly meet this criteria. She also meets criteria for Mi syndrome testing with her maternal history of colon cancer. This risk assessment is based on the family history information provided at the time of the appointment.  The assessment could change in the future should new information be obtained.    At this time, Ms. Burkett’s management should be guided by a family history-based risk assessment.  Xceiveer-enGenezick, version 8 is able to take into account personal factors (age at menarche, age at first live birth, breast density, etc) and family history when calculating risk for breast cancer.  Computer modeling estimates that Ms. Burkett’s lifetime personal risk for developing breast cancer is up to 6.9% (Mariner-Brendazick, v8), in comparison to the average female’s risk of 12.5%. Per NCCN guidelines, a lifetime risk above 20% is considered to be “high risk” where increased screening is warranted; Ms. Burkett’s risk does not fall into that category. This risk assessment is based on the information provided at the time of the appointment and could change in the future should new information be obtained.    GENETIC COUNSELING (30 minutes):  We reviewed the family history information in detail. Cases of cancer follow three general patterns: sporadic, familial, and hereditary.  While most cancer is sporadic, some cases appear to occur in family clusters.  These cases are said to be familial and account for 10-20% of  cancer cases.  Familial cases may be due to a combination of shared genes and environmental factors among family members.  In even fewer cases, the risk for cancer is inherited, and the genes responsible for the increased cancer risk are known.       Family histories typical of hereditary cancer syndromes usually include multiple first- and second-degree relatives diagnosed with cancer types that define a syndrome.  These cases tend to be diagnosed at younger-than-expected ages and can be bilateral or multifocal.  The cancer in these families follows an autosomal dominant inheritance pattern, which indicates the likely presence of a mutation in a cancer susceptibility gene.  Children and siblings of an individual believed to carry this mutation have a 50% chance of inheriting that mutation, thereby inheriting the increased risk to develop cancer.  These mutations can be passed down from the maternal or the paternal lineage.     Due to Ms. Burkett’s family history of breast cancer, we discussed hereditary breast cancer. Hereditary breast cancer accounts for 5-10% of all cases of breast cancer.  A significant proportion (50%) of hereditary breast cancer can be attributed to mutations in the BRCA1 and BRCA2 genes.  Mutations in these genes confer an increased risk for breast cancer, ovarian cancer, male breast cancer, prostate cancer, and pancreatic cancer.  Women with a BRCA1 or BRCA2 mutation have up to an 87% lifetime risk of breast cancer and up to a 44% risk of ovarian cancer.  There are other clinically significant breast cancer related genes in addition to BRCA1/2, including PALB2, SG, and CHEK2.     We also discussed that Mi syndrome is the most common hereditary colon cancer syndrome. It is an autosomal dominant condition caused by mutations in mismatch repair genes (MLH1, MSH2, MSH6, PMS2, and EPCAM).  The lifetime risk for colon cancer for individuals with Mi syndrome is up to 80% if there is no  intervention (i.e. removal of polyps detected on colonoscopy).  Routine screening colonoscopy has been shown to reduce the incidence and mortality of colon cancer in Mi syndrome families by as much as 65%.  Other risks include cancer of the endometrium/uterus, ovary, stomach, urinary tract, small intestines, biliary tract, and brain.      There are other hereditary cancer syndromes as well. Based on Ms. Burkett’s family history and her desire to get more information regarding her personal risks, testing was pursued through a multigene panel evaluating several other genes known to increase the risk for cancer.     GENETIC TESTING:  The risks, benefits, and limitations of genetic testing and implications for clinical management following testing were reviewed.  DNA test results can influence decisions regarding screening and prevention.  Genetic testing can have significant psychological implications for both individuals and families. Also discussed was the possibility of employment and insurance discrimination based on genetic test results and the laws in place to prevent this, as well as the limitations of these laws.       We discussed panel testing, which would involve testing 77 genes associated with increased cancer risk. The implications of a positive or negative test result were discussed.  We also discussed the importance of testing an affected relative and how a negative result for Ms. Burkett wouldn’t necessarily mean the cancers presenting in her family weren’t due to a genetic mutation that Ms. Burkett did not inherit.  In general, a negative genetic test result is most informative if a mutation has first been established in an affected member of the family.  In cases where an affected individual is not available or interested in testing, it is appropriate to offer testing to an unaffected individual. We discussed the possibility that, in some cases, genetic test results may be ambiguous due to the  identification of a genetic variant. These variants may or may not be associated with an increased cancer risk. With multigene panel testing, it is not uncommon for a variant of uncertain significance (VUS) to be identified.  If a VUS is identified, testing family members is typically not recommended and screening recommendations are made based on the family history.  The laboratories that perform genetic testing work to reclassify the VUS and send out an amended report if and when a VUS is reclassified.  The majority of variant findings are ultimately reclassified to a negative result. Given Ms. Burkett’s family history, a negative test result does not eliminate all cancer risk, although the risk would not be as high as it would with positive genetic testing.     TEST RESULTS: Genetic testing was negative for known pathogenic mutations by sequencing and rearrangement testing for the 77 genes on the CancerNext Expanded panel (see attached).  A variant of uncertain significance (VUS) was identified in the FANCC gene, however the majority of VUS’s are ultimately reclassified as benign, therefore this result should not be used in making management decisions. If this variant is ever reclassified to a benign variant or a pathogenic mutation, a new report will be issued by the laboratory and released directly to the ordering physician, and our office. This assessment is based on the information provided at the time of the consultation.    CANCER SCREENING:  Options available to individuals with an elevated lifetime risk for breast and/or ovarian cancer were briefly discussed, including increased surveillance, chemoprevention and prophylactic surgery (mastectomy and/or oophorectomy).  Based on computer modeling, Ms. Burkett’s lifetime risk for breast cancer would not be considered “high risk”. Per NCCN guidelines, it is appropriate for them to follow general population screening guidelines for their breast cancer risk, including  annual clinical breast exam and annual mammography. Annual mammography typically begins ten years prior to the youngest breast cancer diagnosis in a close relative, or age 40, whichever is earliest.  This assessment is based on the information provided at the time of consultation and could change should new information be obtained.    PLAN: Genetic testing was ordered via the CancerNext Expanded Panel through [employer]. Ms. Burkett was given a saliva kit to take home.  Results are expected in 2-3 weeks after [employer] receives the sample and will be called out to Ms. Burkett. If she has any questions in the meantime, she is welcome to call me at [phone].      Olamide Wilder MS, [facility] [place], [facility]  Licensed Certified Genetic Counselor      Cc: Leatha Jones MD

## 2023-02-24 ENCOUNTER — OFFICE VISIT (OUTPATIENT)
Dept: OBSTETRICS AND GYNECOLOGY | Age: 43
End: 2023-02-24
Payer: COMMERCIAL

## 2023-02-24 VITALS
WEIGHT: 213 LBS | BODY MASS INDEX: 37.74 KG/M2 | HEIGHT: 63 IN | DIASTOLIC BLOOD PRESSURE: 82 MMHG | SYSTOLIC BLOOD PRESSURE: 114 MMHG

## 2023-02-24 DIAGNOSIS — E89.40 SURGICAL MENOPAUSE: Primary | ICD-10-CM

## 2023-02-24 DIAGNOSIS — Z80.9 FAMILY HISTORY OF CANCER: ICD-10-CM

## 2023-02-24 PROCEDURE — 99212 OFFICE O/P EST SF 10 MIN: CPT | Performed by: OBSTETRICS & GYNECOLOGY

## 2023-02-24 NOTE — PROGRESS NOTES
"Chief Complaint   Patient presents with   • Gynecologic Exam     Follow up medication         HPI  Leatha Burkett is a 42 y.o. female comes in for office f/u visit. She notes she taken 2 courses of provera and had a menses with the first then no bleeding with last course. She finished last round early February. She is not having hot flashes.         The following portions of the patient's history were reviewed and updated as appropriate: allergies, current medications, past family history, past medical history, past social history, past surgical history and problem list.    Review of Systems  Pertinent items are noted in HPI.    /82   Ht 160 cm (62.99\")   Wt 96.6 kg (213 lb)   BMI 37.74 kg/m²         Physical Exam  Constitutional:       Appearance: Normal appearance.   Neurological:      General: No focal deficit present.      Mental Status: She is alert and oriented to person, place, and time.   Psychiatric:         Behavior: Behavior normal.             Diagnoses and all orders for this visit:    1. Surgical menopause (Primary)  -     Follicle Stimulating Hormone    2. Family history of cancer      Will check FSH as pt has hx of ovarian remnant, discussed she may still have some ovarian activity. Pt notes she feels better off estrogen so will avoid for now.     Pt saw genetics and had testing. She had VUS but not clinically significant mutations.     Records from outside MD not received. Pt will sign release again and attempt to contact as well.           "

## 2023-02-25 LAB — FSH SERPL-ACNC: 69.1 MIU/ML

## 2023-02-28 ENCOUNTER — PROCEDURE VISIT (OUTPATIENT)
Dept: OBSTETRICS AND GYNECOLOGY | Age: 43
End: 2023-02-28
Payer: COMMERCIAL

## 2023-02-28 DIAGNOSIS — Z12.31 VISIT FOR SCREENING MAMMOGRAM: Primary | ICD-10-CM

## 2023-02-28 PROCEDURE — 77067 SCR MAMMO BI INCL CAD: CPT | Performed by: OBSTETRICS & GYNECOLOGY

## 2023-02-28 PROCEDURE — 77063 BREAST TOMOSYNTHESIS BI: CPT | Performed by: OBSTETRICS & GYNECOLOGY

## 2023-03-21 DIAGNOSIS — R92.8 ABNORMAL MAMMOGRAM: Primary | ICD-10-CM

## 2023-03-22 ENCOUNTER — TELEPHONE (OUTPATIENT)
Dept: OBSTETRICS AND GYNECOLOGY | Age: 43
End: 2023-03-22
Payer: COMMERCIAL

## 2023-03-22 PROBLEM — R92.8 ABNORMAL FINDING ON BREAST IMAGING: Status: ACTIVE | Noted: 2023-03-22

## 2023-03-22 NOTE — TELEPHONE ENCOUNTER
Called pt and advised f/u breast imaging needed and this has been ordered. Instructions given and pt will contact C to book. Advised her to call me if she does not receive results of f/u imaging within 5 days.

## 2023-04-03 ENCOUNTER — APPOINTMENT (OUTPATIENT)
Dept: WOMENS IMAGING | Facility: HOSPITAL | Age: 43
End: 2023-04-03
Payer: COMMERCIAL

## 2023-04-03 PROCEDURE — 77061 BREAST TOMOSYNTHESIS UNI: CPT | Performed by: RADIOLOGY

## 2023-04-03 PROCEDURE — G0279 TOMOSYNTHESIS, MAMMO: HCPCS | Performed by: RADIOLOGY

## 2023-04-03 PROCEDURE — 76642 ULTRASOUND BREAST LIMITED: CPT | Performed by: RADIOLOGY

## 2023-04-03 PROCEDURE — 77065 DX MAMMO INCL CAD UNI: CPT | Performed by: RADIOLOGY

## 2023-04-07 DIAGNOSIS — R92.8 ABNORMAL MAMMOGRAM: Primary | ICD-10-CM

## 2023-04-11 ENCOUNTER — TELEPHONE (OUTPATIENT)
Dept: OBSTETRICS AND GYNECOLOGY | Age: 43
End: 2023-04-11
Payer: COMMERCIAL

## 2023-04-11 NOTE — TELEPHONE ENCOUNTER
Called Leatha to review breast imaging results. No answer. Left message of call. Forwarded message to MA to review with pt.

## 2023-05-03 ENCOUNTER — LAB (OUTPATIENT)
Dept: LAB | Facility: HOSPITAL | Age: 43
End: 2023-05-03
Payer: COMMERCIAL

## 2023-05-03 DIAGNOSIS — D64.9 ANEMIA, UNSPECIFIED TYPE: ICD-10-CM

## 2023-05-03 LAB
BASOPHILS # BLD AUTO: 0.03 10*3/MM3 (ref 0–0.2)
BASOPHILS NFR BLD AUTO: 0.4 % (ref 0–1.5)
DEPRECATED RDW RBC AUTO: 41.7 FL (ref 37–54)
EOSINOPHIL # BLD AUTO: 0.08 10*3/MM3 (ref 0–0.4)
EOSINOPHIL NFR BLD AUTO: 1.2 % (ref 0.3–6.2)
ERYTHROCYTE [DISTWIDTH] IN BLOOD BY AUTOMATED COUNT: 12 % (ref 12.3–15.4)
FERRITIN SERPL-MCNC: 161.6 NG/ML (ref 11–207)
HCT VFR BLD AUTO: 42.7 % (ref 34–46.6)
HGB BLD-MCNC: 13.6 G/DL (ref 12–15.9)
HGB RETIC QN AUTO: 33.7 PG (ref 29.8–36.1)
IMM GRANULOCYTES # BLD AUTO: 0.02 10*3/MM3 (ref 0–0.05)
IMM GRANULOCYTES NFR BLD AUTO: 0.3 % (ref 0–0.5)
IMM RETICS NFR: 7.8 % (ref 3–15.8)
IRON 24H UR-MRATE: 119 MCG/DL (ref 37–145)
IRON SATN MFR SERPL: 32 % (ref 14–48)
LYMPHOCYTES # BLD AUTO: 2.43 10*3/MM3 (ref 0.7–3.1)
LYMPHOCYTES NFR BLD AUTO: 35.7 % (ref 19.6–45.3)
MCH RBC QN AUTO: 30 PG (ref 26.6–33)
MCHC RBC AUTO-ENTMCNC: 31.9 G/DL (ref 31.5–35.7)
MCV RBC AUTO: 94.1 FL (ref 79–97)
MONOCYTES # BLD AUTO: 0.75 10*3/MM3 (ref 0.1–0.9)
MONOCYTES NFR BLD AUTO: 11 % (ref 5–12)
NEUTROPHILS NFR BLD AUTO: 3.5 10*3/MM3 (ref 1.7–7)
NEUTROPHILS NFR BLD AUTO: 51.4 % (ref 42.7–76)
NRBC BLD AUTO-RTO: 0 /100 WBC (ref 0–0.2)
PLATELET # BLD AUTO: 301 10*3/MM3 (ref 140–450)
PMV BLD AUTO: 9.7 FL (ref 6–12)
RBC # BLD AUTO: 4.54 10*6/MM3 (ref 3.77–5.28)
RETICS # AUTO: 0.12 10*6/MM3 (ref 0.02–0.13)
RETICS/RBC NFR AUTO: 2.75 % (ref 0.7–1.9)
TIBC SERPL-MCNC: 372 MCG/DL (ref 249–505)
TRANSFERRIN SERPL-MCNC: 266 MG/DL (ref 200–360)
VIT B12 BLD-MCNC: 399 PG/ML (ref 211–946)
WBC NRBC COR # BLD: 6.81 10*3/MM3 (ref 3.4–10.8)

## 2023-05-03 PROCEDURE — 83540 ASSAY OF IRON: CPT

## 2023-05-03 PROCEDURE — 36415 COLL VENOUS BLD VENIPUNCTURE: CPT

## 2023-05-03 PROCEDURE — 85025 COMPLETE CBC W/AUTO DIFF WBC: CPT

## 2023-05-03 PROCEDURE — 82728 ASSAY OF FERRITIN: CPT

## 2023-05-03 PROCEDURE — 82607 VITAMIN B-12: CPT | Performed by: INTERNAL MEDICINE

## 2023-05-03 PROCEDURE — 84466 ASSAY OF TRANSFERRIN: CPT

## 2023-05-03 PROCEDURE — 85046 RETICYTE/HGB CONCENTRATE: CPT

## 2023-05-04 LAB
FOLATE BLD-MCNC: 476 NG/ML
FOLATE RBC-MCNC: 1170 NG/ML
HCT VFR BLD AUTO: 40.7 % (ref 34–46.6)

## 2023-05-10 ENCOUNTER — OFFICE VISIT (OUTPATIENT)
Dept: ONCOLOGY | Facility: CLINIC | Age: 43
End: 2023-05-10
Payer: COMMERCIAL

## 2023-05-10 VITALS
HEART RATE: 78 BPM | DIASTOLIC BLOOD PRESSURE: 81 MMHG | RESPIRATION RATE: 16 BRPM | OXYGEN SATURATION: 97 % | SYSTOLIC BLOOD PRESSURE: 105 MMHG | BODY MASS INDEX: 37.07 KG/M2 | WEIGHT: 209.2 LBS | HEIGHT: 63 IN | TEMPERATURE: 98.2 F

## 2023-05-10 DIAGNOSIS — K21.9 GASTROESOPHAGEAL REFLUX DISEASE WITHOUT ESOPHAGITIS: ICD-10-CM

## 2023-05-10 DIAGNOSIS — D64.9 ANEMIA, UNSPECIFIED TYPE: Primary | ICD-10-CM

## 2023-05-10 PROCEDURE — 99214 OFFICE O/P EST MOD 30 MIN: CPT | Performed by: INTERNAL MEDICINE

## 2023-05-10 RX ORDER — NALTREXONE HYDROCHLORIDE AND BUPROPION HYDROCHLORIDE 8; 90 MG/1; MG/1
TABLET, EXTENDED RELEASE ORAL
COMMUNITY
Start: 2023-05-03

## 2023-05-10 NOTE — PROGRESS NOTES
.     REASON FOR FOLLOWUP :   Iron deficiency anemia    HISTORY OF PRESENT ILLNESS:  The patient is a 42 y.o. year old female  who is here for follow-up with the above-mentioned history.    States for the past month she has been having chest pressure.  Mostly at rest, sometimes with walking.  However, does not seem to correlate with walking as she regularly walks 30 minutes at a time at a brisk pace without stopping and most of the time does not have chest pressure.  Does not get chest pressure with carrying in groceries or carrying laundry up and down steps.  Therefore, chest pressure does not seem to correlate with activity.    Denies bleeding.    She thinks her chest pressure is related to heartburn.  States she has a history of peptic ulcer disease on last EGD at [facility].  States she called them to see if she needed to follow-up EGD to assess for healing and they told her no.  She has not seen a GI in [place].  She takes Carafate and omeprazole.  She would like a referral to GI.    Past Medical History:   Diagnosis Date   • Anemia 2016   • Anxiety    • Bariatric surgery status    • Depression    • Essential hypertension, benign    • GERD (gastroesophageal reflux disease)    • Obesity    • МАРИНА (obstructive sleep apnea)    • PCOS (polycystic ovarian syndrome)      Past Surgical History:   Procedure Laterality Date   • BARIATRIC SURGERY  2003   • COLONOSCOPY  2022   • DILATATION AND CURETTAGE  1999   • ENDOSCOPY  2022   • GASTRIC BYPASS  2003   • OOPHORECTOMY Right 2004   • OOPHORECTOMY  2021   • OVARY SURGERY Bilateral     right removed 2006  left removed 2021       MEDICATIONS    Current Outpatient Medications:   •  metFORMIN (GLUCOPHAGE) 1000 MG tablet, Take 1 tablet by mouth Daily With Breakfast., Disp: 90 tablet, Rfl: 1  •  multivitamin (THERAGRAN) tablet tablet, Take 1 tablet by mouth., Disp: , Rfl:   •  naltrexone-bupropion ER (Contrave) 8-90 MG tablet, 2 tab PO BID., Disp: , Rfl:   •  omeprazole  (priLOSEC) 40 MG capsule, , Disp: , Rfl:   •  sucralfate (CARAFATE) 1 g tablet, , Disp: , Rfl:   •  medroxyPROGESTERone (Provera) 10 MG tablet, Take 1 tablet by mouth Daily. For 12 days per month (Patient not taking: Reported on 5/10/2023), Disp: 12 tablet, Rfl: 2    ALLERGIES:   No Known Allergies    SOCIAL HISTORY:       Social History     Socioeconomic History   • Marital status:      Spouse name: Galilea   • Number of children: 2   Tobacco Use   • Smoking status: Never   • Smokeless tobacco: Never   Vaping Use   • Vaping Use: Never used   Substance and Sexual Activity   • Alcohol use: Yes     Comment: socially   • Drug use: Never   • Sexual activity: Yes     Partners: Female     Birth control/protection: None         FAMILY HISTORY:  Family History   Problem Relation Age of Onset   • Hypertension Father    • Cancer Father         Pancreatic   • Diabetes Father    • Heart disease Father    • Pancreatic cancer Father    • Sleep apnea Father    • Heart disease Mother    • Hypertension Mother    • Hyperlipidemia Mother    • Cancer Brother         Lung/smoker   • Early death Brother    • Lung cancer Brother    • Diabetes Sister    • Heart disease Sister         Heart attack 50s   • Heart attack Sister    • Heart attack Paternal Grandfather    • Stroke Paternal Grandmother    • Colon cancer Maternal Grandmother    • Cancer Maternal Grandmother         Colon (did not cause death)   • Colon cancer Maternal Grandfather    • Cancer Maternal Grandfather         Colon   • Colon cancer Maternal Uncle    • Ovarian cancer Other    • Breast cancer Neg Hx    • Uterine cancer Neg Hx        REVIEW OF SYSTEMS:  Review of Systems   Constitutional: Negative for activity change.   HENT: Negative for nosebleeds and trouble swallowing.    Respiratory: Negative for shortness of breath and wheezing.    Cardiovascular: Negative for chest pain and palpitations.   Gastrointestinal: Negative for constipation, diarrhea and nausea.  "  Genitourinary: Negative for dysuria and hematuria.   Musculoskeletal: Negative for arthralgias and myalgias.   Skin: Negative for rash and wound.   Neurological: Negative for seizures and syncope.   Hematological: Negative for adenopathy. Does not bruise/bleed easily.   Psychiatric/Behavioral: Negative for confusion.              Vitals:    05/10/23 1244   BP: 105/81   Pulse: 78   Resp: 16   Temp: 98.2 °F (36.8 °C)   TempSrc: Temporal   SpO2: 97%   Weight: 94.9 kg (209 lb 3.2 oz)   Height: 160 cm (62.99\")   PainSc: 0-No pain         5/10/2023    12:45 PM   Current Status   ECOG score 0      PHYSICAL EXAM:        CONSTITUTIONAL:  Vital signs reviewed.  No distress, looks comfortable.  EYES:  Conjunctiva and lids unremarkable.  PERRLA  EARS,NOSE,MOUTH,THROAT:  Ears and nose appear unremarkable.  Lips, teeth, gums appear unremarkable.  RESPIRATORY:  Normal respiratory effort.  Lungs clear to auscultation bilaterally.  CARDIOVASCULAR:  Normal S1, S2.  No murmurs rubs or gallops.  No significant lower extremity edema.  GASTROINTESTINAL: Abdomen appears unremarkable.  Nontender.  No hepatomegaly.  No splenomegaly.  LYMPHATIC:  No cervical, supraclavicular, axillary lymphadenopathy.  SKIN:  Warm.  No rashes.  PSYCHIATRIC:  Normal judgment and insight.  Normal mood and affect.        RECENT LABS:        WBC   Date Value Ref Range Status   05/03/2023 6.81 3.40 - 10.80 10*3/mm3 Final   11/02/2022 7.02 3.40 - 10.80 10*3/mm3 Final   09/19/2022 5.92 3.40 - 10.80 10*3/mm3 Final     Hemoglobin   Date Value Ref Range Status   05/03/2023 13.6 12.0 - 15.9 g/dL Final   11/02/2022 14.8 12.0 - 15.9 g/dL Final   09/19/2022 14.4 12.0 - 15.9 g/dL Final     Platelets   Date Value Ref Range Status   05/03/2023 301 140 - 450 10*3/mm3 Final   11/02/2022 248 140 - 450 10*3/mm3 Final   09/19/2022 264 140 - 450 10*3/mm3 Final       Assessment & Plan   There are no diagnoses linked to this encounter.      Leatha Burkett   *Iron deficiency " anemia  · History of IV iron, which typically improves body cramps/aches/fatigue  · 11/2/2022: Transferred care to our practice after moving to [place] from [place].  Previously under the care of Dr. Marquise Huerta, hematology oncology at [facility] in [place], [place].  He reported she was needing IV iron on average annually  · Cannot tolerate oral iron due to constipation and abdominal pain.  Also oral iron is not effective due to poor absorption (has not been effective in the past and she has gastric bypass, which is felt to decrease absorption)  · 9/19/2022: Ferritin 241, serum iron 104 (normal iron saturation).  Hb 14.4.  MCV 91.6.  No need for IV iron at this time.  · 5/3/2023: Ferritin 162, 32% saturation, Hb 13.6.  No need for IV iron at this time    *Source of iron deficiency  · Gastric bypass 2003, [facility]  · History of menorrhagia.  First ovary removed 2006.  Second ovary removed November 2021.  (Ovaries were removed due to cysts.)    · No vaginal bleeding since then.  · Patient's last EGD and colonoscopy April 2021.  She states she was told to have the next 5 years later.  · 5/10/2023: Patient states ongoing GERD despite omeprazole and Carafate.  She states last EGD showed ulcers.  She would like to see a GI in [place].  Last EGD was through [facility].    *B12 deficiency, since gastric bypass surgery.  · Monthly IM B12 injections at home  · B12 819 on 9/19/2022.  · B12 399 on 5/3/2023.  She reported she forgot to do the B12 monthly self injections and has not been on these for the past 6 months.  Encouraged to resume    *Gastric bypass surgery, 2003 (lost 150 pounds).  · RBC folate normal, 1170, on 5/3/2023    *Family history of colon cancer  · Mom age 48, maternal grandmother age 45, maternal grandfather age 68.  · Patient's last EGD and colonoscopy April 2021.  She states she was told to have the next 5 years later.  · She declines genetic counseling but knows it is  available    *Class II obesity.  Being overweight can lead to cytopenias through hepatic steatosis.  Body mass index is 37.07 kg/m².  BMI 25 to <30 is overweight  BMI 30 to <35 is class 1 obesity  BMI 35 to <40 is class 2 obesity  BMI 40 or higher is class 3 obesity   Ideally, lose weight    Plan  · MD 6 months.  1 week prior: Ferritin, iron panel, CBC, reticulate hemoglobin, B12 level, RBC folate  · (had also been getting folate checked).  · Vitamin B12 1000 mcg IM monthly prescriptions from our office.  3 months at a time, 3 refills  · Referral to GI

## 2023-08-02 ENCOUNTER — OFFICE VISIT (OUTPATIENT)
Dept: FAMILY MEDICINE CLINIC | Facility: CLINIC | Age: 43
End: 2023-08-02
Payer: COMMERCIAL

## 2023-08-02 VITALS
SYSTOLIC BLOOD PRESSURE: 118 MMHG | RESPIRATION RATE: 18 BRPM | HEIGHT: 63 IN | BODY MASS INDEX: 37.21 KG/M2 | WEIGHT: 210 LBS | OXYGEN SATURATION: 97 % | HEART RATE: 81 BPM | DIASTOLIC BLOOD PRESSURE: 84 MMHG

## 2023-08-02 DIAGNOSIS — M25.511 ACUTE PAIN OF RIGHT SHOULDER: Primary | ICD-10-CM

## 2023-08-02 RX ORDER — BUPROPION HYDROCHLORIDE 150 MG/1
1 TABLET, EXTENDED RELEASE ORAL EVERY 12 HOURS
COMMUNITY
Start: 2023-07-31

## 2023-08-02 RX ORDER — PEN NEEDLE, DIABETIC 32 GX 1/4"
NEEDLE, DISPOSABLE MISCELLANEOUS
COMMUNITY
Start: 2023-07-31

## 2023-08-02 RX ORDER — METHYLPREDNISOLONE 4 MG/1
TABLET ORAL
Qty: 21 TABLET | Refills: 0 | Status: SHIPPED | OUTPATIENT
Start: 2023-08-02

## 2023-08-22 ENCOUNTER — OFFICE VISIT (OUTPATIENT)
Dept: ORTHOPEDIC SURGERY | Facility: CLINIC | Age: 43
End: 2023-08-22
Payer: COMMERCIAL

## 2023-08-22 VITALS — WEIGHT: 206.7 LBS | TEMPERATURE: 97.3 F | HEIGHT: 63 IN | BODY MASS INDEX: 36.62 KG/M2

## 2023-08-22 DIAGNOSIS — M75.41 IMPINGEMENT SYNDROME OF RIGHT SHOULDER: ICD-10-CM

## 2023-08-22 DIAGNOSIS — M25.511 RIGHT SHOULDER PAIN, UNSPECIFIED CHRONICITY: Primary | ICD-10-CM

## 2023-08-22 RX ORDER — MELOXICAM 15 MG/1
TABLET ORAL
Qty: 30 TABLET | Refills: 0 | Status: SHIPPED | OUTPATIENT
Start: 2023-08-22

## 2023-08-22 RX ADMIN — METHYLPREDNISOLONE ACETATE 1 ML: 80 INJECTION, SUSPENSION INTRA-ARTICULAR; INTRALESIONAL; INTRAMUSCULAR; SOFT TISSUE at 17:01

## 2023-08-22 RX ADMIN — LIDOCAINE HYDROCHLORIDE 2 ML: 10 INJECTION, SOLUTION EPIDURAL; INFILTRATION; INTRACAUDAL; PERINEURAL at 17:01

## 2023-08-22 NOTE — PROGRESS NOTES
New Shoulder      Patient: Leatha Burkett        YOB: 1980    Medical Record Number: [id number]        Chief Complaints: Right shoulder pain      History of Present Illness: This is a 42-year-old female who presents complaining of right shoulder pain she is right-hand dominant been ongoing for over 1 months she reached on the top shelf to  son's about 10 pounds and felt a pull and a pop in her shoulder.  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  Symptoms are moderate intermittent aching worse with activity her past medical history as listed below and reviewed by me she is on Glucophage      Allergies: No Known Allergies    Medications:   Home Medications:  Current Outpatient Medications on File Prior to Visit   Medication Sig    buPROPion SR (WELLBUTRIN SR) 150 MG 12 hr tablet Take 1 tablet by mouth Every 12 (Twelve) Hours.    Liraglutide (SAXENDA) 18 MG/3ML injection pen 0.6 mg subcut Route daily x 1 week, increase dose by 0.6 mg weekly as tolerated to reach 3 mg dose    multivitamin (THERAGRAN) tablet tablet Take 1 tablet by mouth.    pantoprazole (PROTONIX) 40 MG EC tablet Take 1 tablet by mouth Daily.    sucralfate (CARAFATE) 1 g tablet Take 1 tablet by mouth 4 (Four) Times a Day.    BD Pen Needle Micro U/F 32G X 6 MM misc  (Patient not taking: Reported on 8/22/2023)    methylPREDNISolone (MEDROL) 4 MG dose pack Take as directed on package instructions.    naltrexone-bupropion ER (Contrave) 8-90 MG tablet 2 tab PO BID. (Patient not taking: Reported on 8/2/2023)     No current facility-administered medications on file prior to visit.     Current Medications:  Scheduled Meds:  Continuous Infusions:No current facility-administered medications for this visit.    PRN Meds:.    Past Medical History:   Diagnosis Date    Anemia 2016    Anxiety     Bariatric surgery status     Depression     Essential hypertension, benign     GERD  (gastroesophageal reflux disease)     Hernia 2012    Obesity     МАРИНА (obstructive sleep apnea)     PCOS (polycystic ovarian syndrome)     Ulcer 2022        Past Surgical History:   Procedure Laterality Date    ABDOMINAL SURGERY  2002    BARIATRIC SURGERY  2003    COLONOSCOPY  2022    DILATATION AND CURETTAGE  1999    ENDOSCOPY  2022    GASTRIC BYPASS  2003    HERNIA REPAIR  2012    OOPHORECTOMY Right 2004    OOPHORECTOMY  2021    OVARY SURGERY Bilateral     right removed 2006  left removed 2021    UPPER GASTROINTESTINAL ENDOSCOPY          Social History     Occupational History    Not on file   Tobacco Use    Smoking status: Never    Smokeless tobacco: Never   Vaping Use    Vaping Use: Never used   Substance and Sexual Activity    Alcohol use: Yes     Comment: socially    Drug use: Never    Sexual activity: Yes     Partners: Female     Birth control/protection: None, Same-sex partner      Social History     Social History Narrative    Not on file        Family History   Problem Relation Age of Onset    Hypertension Father     Cancer Father         Pancreatic    Diabetes Father     Heart disease Father     Pancreatic cancer Father     Sleep apnea Father     Heart disease Mother     Hypertension Mother     Hyperlipidemia Mother     Cancer Brother         Lung/smoker    Early death Brother     Lung cancer Brother     Diabetes Sister     Heart disease Sister         Heart attack 50s    Heart attack Sister     Heart attack Paternal Grandfather     Stroke Paternal Grandmother     Colon cancer Maternal Grandmother     Cancer Maternal Grandmother         Colon (did not cause death)    Colon cancer Maternal Grandfather     Cancer Maternal Grandfather         Colon    Colon cancer Maternal Uncle     Ovarian cancer Other     Breast cancer Neg Hx     Uterine cancer Neg Hx              Review of Systems:     Review of Systems      Physical Exam: 42 y.o. female  General Appearance:    Alert, cooperative, in no acute distress       "             Vitals:    08/22/23 1643   Temp: 97.3 øF (36.3 øC)   Weight: 93.8 kg (206 lb 11.2 oz)   Height: 160 cm (63\")   PainSc:   6   PainLoc: Shoulder      Patient is alert and read x3 no acute distress appears her above-listed at height weight and age.  Affect is normal respiratory rate is normal unlabored. Heart rate regular rate rhythm, sclera, dentition and hearing are normal for the purpose of this exam.    Ortho Exam  Physical exam of the right shoulder reveals no overlying skin changes no lymphedema no lymphadenopathy.  Patient has active flexion 170 with mild symptoms abduction is similar external rotation is to 40 and internal rotation to the upper lumbar spine with mild symptoms.  Patient has good rotator cuff strength 4+ over 5 with isometric strength testing with pain.  Patient has a positive impingement and a positive Montoya sign.  Patient has good cervical range of motion which is full and asymptomatic no radicular symptoms.  Patient has a normal elbow exam.  Good distal pulses are present  Patient has pain with overhead activity and a positive Neer sign and a positive empty can sign , a positive drop arm and a definitive painful arc   Large Joint Arthrocentesis: R subacromial bursa  Date/Time: 8/22/2023 5:01 PM  Consent given by: patient  Site marked: site marked  Timeout: Immediately prior to procedure a time out was called to verify the correct patient, procedure, equipment, support staff and site/side marked as required   Supporting Documentation  Indications: pain   Procedure Details  Location: shoulder - R subacromial bursa  Preparation: Patient was prepped and draped in the usual sterile fashion  Needle gauge: 21G.  Approach: posterior  Medications administered: 1 mL methylPREDNISolone acetate 80 MG/ML; 2 mL lidocaine PF 1% 1 %  Patient tolerance: patient tolerated the procedure well with no immediate complications            Radiology:   AP, Scapular Y and Axillary Lateral of the right " shoulder were ordered/reviewed to evauate shoulder pain.  I have no comparative films these are essentially normal she has some mild narrowing of her acromioclavicular joint  Imaging Results (Most Recent)       Procedure Component Value Units Date/Time    XR Shoulder 2+ View Right [[id number]] Resulted: 08/22/23 1558     Updated: 08/22/23 1558    Impression:      Ordering physician's impression is located in the Encounter Note dated 08/22/23. X-ray performed in the DR room.            Assessment/Plan: Right shoulder pain I think this is impingement rotator cuff in some fashion plan is to proceed with an injection as a diagnostic and therapeutic tool I will start her on some meloxicam with strict precautions for short period of time and start her into physical therapy.  If she should fail this we will pursue other means of testing  Cortisone Injection. See procedure note.  Cortisone Injection for DIAGNOSTIC and THERAPUTIC purposes.

## 2023-08-23 ENCOUNTER — PATIENT ROUNDING (BHMG ONLY) (OUTPATIENT)
Dept: ORTHOPEDIC SURGERY | Facility: CLINIC | Age: 43
End: 2023-08-23
Payer: COMMERCIAL

## 2023-08-23 NOTE — PROGRESS NOTES
A YAZUO Message has been sent to the patient for PATIENT ROUNDING with [facility]

## 2023-08-24 RX ORDER — METHYLPREDNISOLONE ACETATE 80 MG/ML
1 INJECTION, SUSPENSION INTRA-ARTICULAR; INTRALESIONAL; INTRAMUSCULAR; SOFT TISSUE
Status: COMPLETED | OUTPATIENT
Start: 2023-08-22 | End: 2023-08-22

## 2023-08-24 RX ORDER — LIDOCAINE HYDROCHLORIDE 10 MG/ML
2 INJECTION, SOLUTION EPIDURAL; INFILTRATION; INTRACAUDAL; PERINEURAL
Status: COMPLETED | OUTPATIENT
Start: 2023-08-22 | End: 2023-08-22

## 2023-10-03 ENCOUNTER — APPOINTMENT (OUTPATIENT)
Dept: WOMENS IMAGING | Facility: HOSPITAL | Age: 43
End: 2023-10-03
Payer: COMMERCIAL

## 2023-10-03 PROCEDURE — 76642 ULTRASOUND BREAST LIMITED: CPT | Performed by: RADIOLOGY

## 2023-10-03 PROCEDURE — 77061 BREAST TOMOSYNTHESIS UNI: CPT | Performed by: RADIOLOGY

## 2023-10-03 PROCEDURE — 77065 DX MAMMO INCL CAD UNI: CPT | Performed by: RADIOLOGY

## 2023-10-03 PROCEDURE — G0279 TOMOSYNTHESIS, MAMMO: HCPCS | Performed by: RADIOLOGY

## 2023-10-05 DIAGNOSIS — R92.8 ABNORMAL MAMMOGRAM: Primary | ICD-10-CM

## 2023-10-23 ENCOUNTER — TELEPHONE (OUTPATIENT)
Dept: ONCOLOGY | Facility: CLINIC | Age: 43
End: 2023-10-23
Payer: COMMERCIAL

## 2023-10-23 NOTE — TELEPHONE ENCOUNTER
Caller: Leatha Burkett    Relationship to patient: Self    Best call back number: [phone]    Chief complaint:    Type of visit: LAB, VITALS & F/U 1     Requested date: AFTER 230, CALL TO R/S     If rescheduling, when is the original appointment: 11/6/2023 & 11/8/2023

## 2023-11-16 ENCOUNTER — OFFICE VISIT (OUTPATIENT)
Dept: ORTHOPEDIC SURGERY | Facility: CLINIC | Age: 43
End: 2023-11-16
Payer: COMMERCIAL

## 2023-11-16 VITALS — HEIGHT: 63 IN | BODY MASS INDEX: 35.98 KG/M2 | TEMPERATURE: 98 F | WEIGHT: 203.1 LBS

## 2023-11-16 DIAGNOSIS — R52 PAIN: ICD-10-CM

## 2023-11-16 DIAGNOSIS — M70.61 GREATER TROCHANTERIC BURSITIS OF RIGHT HIP: Primary | ICD-10-CM

## 2023-11-16 RX ORDER — SEMAGLUTIDE 1.7 MG/.75ML
0.75 INJECTION, SOLUTION SUBCUTANEOUS
COMMUNITY
Start: 2023-11-04

## 2023-11-16 RX ADMIN — METHYLPREDNISOLONE ACETATE 80 MG: 80 INJECTION, SUSPENSION INTRA-ARTICULAR; INTRALESIONAL; INTRAMUSCULAR; SOFT TISSUE at 16:34

## 2023-11-16 RX ADMIN — LIDOCAINE HYDROCHLORIDE 2 ML: 10 INJECTION, SOLUTION EPIDURAL; INFILTRATION; INTRACAUDAL; PERINEURAL at 16:34

## 2023-11-16 NOTE — PROGRESS NOTES
General Exam    Patient: Leatha Burkett    YOB: 1980    Medical Record Number: [id number]    Chief Complaints: Right hip pain    History of Present Illness:     42 y.o. female patient who presents for evaluation of right hip pain.  Patient states that issues for some time but worse over the past few months.  Pain is mainly laterally based.  Occasionally radiates down.  Occasionally she gets some radiation anteriorly and to her back.  Its worse with activity as well as stairs.  She also recently increased doing stairs more regularly to work on some weight loss.  Denies any groin pain.    Denies any numbness or tingling.  Denies any fevers, cough or shortness of breath.    Allergies: No Known Allergies    Home Medications:      Current Outpatient Medications:     buPROPion SR (WELLBUTRIN SR) 150 MG 12 hr tablet, Take 1 tablet by mouth Every 12 (Twelve) Hours., Disp: , Rfl:     metFORMIN (GLUCOPHAGE) 1000 MG tablet, TAKE 1 TABLET BY MOUTH DAILY with breakfast, Disp: 90 tablet, Rfl: 0    multivitamin (THERAGRAN) tablet tablet, Take 1 tablet by mouth., Disp: , Rfl:     pantoprazole (PROTONIX) 40 MG EC tablet, Take 1 tablet by mouth Daily., Disp: 90 tablet, Rfl: 3    Wegovy 1.7 MG/0.75ML solution auto-injector, Inject 0.75 mL under the skin into the appropriate area as directed Every 7 (Seven) Days., Disp: , Rfl:     BD Pen Needle Micro U/F 32G X 6 MM misc, , Disp: , Rfl:     Liraglutide (SAXENDA) 18 MG/3ML injection pen, 0.6 mg subcut Route daily x 1 week, increase dose by 0.6 mg weekly as tolerated to reach 3 mg dose, Disp: , Rfl:     meloxicam (MOBIC) 15 MG tablet, 1 PO Daily with food., Disp: 30 tablet, Rfl: 0    methylPREDNISolone (MEDROL) 4 MG dose pack, Take as directed on package instructions., Disp: 21 tablet, Rfl: 0    naltrexone-bupropion ER (Contrave) 8-90 MG tablet, 2 tab PO BID. (Patient not taking: Reported on 8/2/2023), Disp: , Rfl:     sucralfate (CARAFATE) 1 g tablet, Take 1 tablet by mouth  4 (Four) Times a Day. (Patient not taking: Reported on 11/16/2023), Disp: 120 tablet, Rfl: 11    Past Medical History:   Diagnosis Date    Anemia 2016    Anxiety     Bariatric surgery status     Depression     Essential hypertension, benign     GERD (gastroesophageal reflux disease)     Hernia 2012    Obesity     МАРИНА (obstructive sleep apnea)     PCOS (polycystic ovarian syndrome)     Ulcer 2022       Past Surgical History:   Procedure Laterality Date    ABDOMINAL SURGERY  2002    BARIATRIC SURGERY  2003    COLONOSCOPY  2022    DILATATION AND CURETTAGE  1999    ENDOSCOPY  2022    GASTRIC BYPASS  2003    HERNIA REPAIR  2012    OOPHORECTOMY Right 2004    OOPHORECTOMY  2021    OVARY SURGERY Bilateral     right removed 2006  left removed 2021    UPPER GASTROINTESTINAL ENDOSCOPY         Social History     Occupational History    Not on file   Tobacco Use    Smoking status: Never    Smokeless tobacco: Never   Vaping Use    Vaping Use: Never used   Substance and Sexual Activity    Alcohol use: Yes     Comment: socially    Drug use: Never    Sexual activity: Yes     Partners: Female     Birth control/protection: None, Same-sex partner      Social History     Social History Narrative    Not on file       Family History   Problem Relation Age of Onset    Hypertension Father     Cancer Father         Pancreatic    Diabetes Father     Heart disease Father     Pancreatic cancer Father     Sleep apnea Father     Heart disease Mother     Hypertension Mother     Hyperlipidemia Mother     Cancer Brother         Lung/smoker    Early death Brother     Lung cancer Brother     Diabetes Sister     Heart disease Sister         Heart attack 50s    Heart attack Sister     Heart attack Paternal Grandfather     Stroke Paternal Grandmother     Colon cancer Maternal Grandmother     Cancer Maternal Grandmother         Colon (did not cause death)    Colon cancer Maternal Grandfather     Cancer Maternal Grandfather         Colon    Colon cancer  "Maternal Uncle     Ovarian cancer Other     Breast cancer Neg Hx     Uterine cancer Neg Hx        Review of Systems:      Constitutional: Denies fever, shaking or chills         All other pertinent positives and negatives as noted above in HPI.    Physical Exam: 42 y.o. female    Vitals:    11/16/23 1521   Temp: 98 °F (36.7 °C)   TempSrc: Temporal   Weight: 92.1 kg (203 lb 1.6 oz)   Height: 160 cm (62.99\")       General:  Patient is awake and alert.  Appears in no acute distress or discomfort.      Musculoskeletal/Extremities:    Right lower extremity examined positive tenderness of the greater trochanter.  Hip range of motion full and painless.  Negative straight leg raise and Stinchfield.  Motor and sensory intact distally.         Radiology:       3 views right hip AP pelvis, AP and lateral taken and reviewed  to evaluate the patient's complaint/s.    Imaging demonstrates some minimal to mild degenerative changes some early bone sclerosis and possible osteophyte formation noted.  Otherwise no abnormal findings     No imaging for comparison.    Assessment: Left hip greater trochanteric bursitis      Plan:      I discussed the findings with the patient.  Recommend conservative treatment this time consisting of rest, ice, activity modification, anti-inflammatories if can take and tolerate, formal physical therapy.  We also will do an injection today.  Told the patient this may take 6 to 8 weeks to resolve and treated appropriately and can return thus the importance of physical therapy.  She expressed understanding of this.  All questions answered.    ..Large Joint Arthrocentesis: R greater trochanteric bursa  Date/Time: 11/16/2023 4:34 PM  Consent given by: patient  Site marked: site marked  Timeout: Immediately prior to procedure a time out was called to verify the correct patient, procedure, equipment, support staff and site/side marked as required   Supporting Documentation  Indications: pain   Procedure " Details  Location: hip - R greater trochanteric bursa  Preparation: Patient was prepped and draped in the usual sterile fashion  Needle gauge: 21g.  Approach: anterolateral  Medications administered: 2 mL lidocaine PF 1% 1 %; 80 mg methylPREDNISolone acetate 80 MG/ML  Patient tolerance: patient tolerated the procedure well with no immediate complications                  We will plan for follow up as needed.    All questions were answered.  Patient understands and agrees with the plan.    Javed Tejada MD    11/16/2023    CC to Chitra Hinojosa APRN

## 2023-11-17 RX ORDER — METHYLPREDNISOLONE ACETATE 80 MG/ML
80 INJECTION, SUSPENSION INTRA-ARTICULAR; INTRALESIONAL; INTRAMUSCULAR; SOFT TISSUE
Status: COMPLETED | OUTPATIENT
Start: 2023-11-16 | End: 2023-11-16

## 2023-11-17 RX ORDER — LIDOCAINE HYDROCHLORIDE 10 MG/ML
2 INJECTION, SOLUTION EPIDURAL; INFILTRATION; INTRACAUDAL; PERINEURAL
Status: COMPLETED | OUTPATIENT
Start: 2023-11-16 | End: 2023-11-16

## 2023-12-18 ENCOUNTER — LAB (OUTPATIENT)
Dept: LAB | Facility: HOSPITAL | Age: 43
End: 2023-12-18
Payer: COMMERCIAL

## 2023-12-18 ENCOUNTER — TREATMENT (OUTPATIENT)
Age: 43
End: 2023-12-18
Payer: COMMERCIAL

## 2023-12-18 DIAGNOSIS — D64.9 ANEMIA, UNSPECIFIED TYPE: ICD-10-CM

## 2023-12-18 DIAGNOSIS — K21.9 GASTROESOPHAGEAL REFLUX DISEASE WITHOUT ESOPHAGITIS: ICD-10-CM

## 2023-12-18 DIAGNOSIS — M70.61 GREATER TROCHANTERIC BURSITIS OF RIGHT HIP: Primary | ICD-10-CM

## 2023-12-18 LAB
BASOPHILS # BLD AUTO: 0.02 10*3/MM3 (ref 0–0.2)
BASOPHILS NFR BLD AUTO: 0.3 % (ref 0–1.5)
DEPRECATED RDW RBC AUTO: 41.3 FL (ref 37–54)
EOSINOPHIL # BLD AUTO: 0.03 10*3/MM3 (ref 0–0.4)
EOSINOPHIL NFR BLD AUTO: 0.4 % (ref 0.3–6.2)
ERYTHROCYTE [DISTWIDTH] IN BLOOD BY AUTOMATED COUNT: 11.9 % (ref 12.3–15.4)
FERRITIN SERPL-MCNC: 140 NG/ML (ref 13–150)
HCT VFR BLD AUTO: 42.4 % (ref 34–46.6)
HGB BLD-MCNC: 13.9 G/DL (ref 12–15.9)
HGB RETIC QN AUTO: 34.9 PG (ref 29.8–36.1)
IMM GRANULOCYTES # BLD AUTO: 0.02 10*3/MM3 (ref 0–0.05)
IMM GRANULOCYTES NFR BLD AUTO: 0.3 % (ref 0–0.5)
IMM RETICS NFR: 6.5 % (ref 3–15.8)
IRON 24H UR-MRATE: 87 MCG/DL (ref 37–145)
IRON SATN MFR SERPL: 25 % (ref 20–50)
LYMPHOCYTES # BLD AUTO: 0.97 10*3/MM3 (ref 0.7–3.1)
LYMPHOCYTES NFR BLD AUTO: 13.7 % (ref 19.6–45.3)
MCH RBC QN AUTO: 31.1 PG (ref 26.6–33)
MCHC RBC AUTO-ENTMCNC: 32.8 G/DL (ref 31.5–35.7)
MCV RBC AUTO: 94.9 FL (ref 79–97)
MONOCYTES # BLD AUTO: 0.32 10*3/MM3 (ref 0.1–0.9)
MONOCYTES NFR BLD AUTO: 4.5 % (ref 5–12)
NEUTROPHILS NFR BLD AUTO: 5.74 10*3/MM3 (ref 1.7–7)
NEUTROPHILS NFR BLD AUTO: 80.8 % (ref 42.7–76)
NRBC BLD AUTO-RTO: 0 /100 WBC (ref 0–0.2)
PLATELET # BLD AUTO: 276 10*3/MM3 (ref 140–450)
PMV BLD AUTO: 10 FL (ref 6–12)
RBC # BLD AUTO: 4.47 10*6/MM3 (ref 3.77–5.28)
RETICS # AUTO: 0.1 10*6/MM3 (ref 0.02–0.13)
RETICS/RBC NFR AUTO: 2.28 % (ref 0.7–1.9)
TIBC SERPL-MCNC: 353 MCG/DL (ref 298–536)
TRANSFERRIN SERPL-MCNC: 252 MG/DL (ref 200–360)
VIT B12 BLD-MCNC: 178 PG/ML (ref 211–946)
WBC NRBC COR # BLD AUTO: 7.1 10*3/MM3 (ref 3.4–10.8)

## 2023-12-18 PROCEDURE — 36415 COLL VENOUS BLD VENIPUNCTURE: CPT

## 2023-12-18 PROCEDURE — 82728 ASSAY OF FERRITIN: CPT

## 2023-12-18 PROCEDURE — 97110 THERAPEUTIC EXERCISES: CPT | Performed by: PHYSICAL THERAPIST

## 2023-12-18 PROCEDURE — 97530 THERAPEUTIC ACTIVITIES: CPT | Performed by: PHYSICAL THERAPIST

## 2023-12-18 PROCEDURE — 83540 ASSAY OF IRON: CPT

## 2023-12-18 PROCEDURE — 85046 RETICYTE/HGB CONCENTRATE: CPT

## 2023-12-18 PROCEDURE — 85025 COMPLETE CBC W/AUTO DIFF WBC: CPT

## 2023-12-18 PROCEDURE — 97162 PT EVAL MOD COMPLEX 30 MIN: CPT | Performed by: PHYSICAL THERAPIST

## 2023-12-18 PROCEDURE — 84466 ASSAY OF TRANSFERRIN: CPT

## 2023-12-18 PROCEDURE — 82607 VITAMIN B-12: CPT | Performed by: INTERNAL MEDICINE

## 2023-12-18 NOTE — PROGRESS NOTES
Physical Therapy Initial Evaluation and Plan of Care  [facility] [place]   [address] [place] [address]  P: [phone]       F: [phone]       Patient: Leatha Burkett   : 1980  Visit Diagnoses:     ICD-10-CM ICD-9-CM   1. Greater trochanteric bursitis of right hip  M70.61 726.5     Referring practitioner: Javed Tejada MD  Date of Initial Visit: 2023  Today's Date: 2023  Patient seen for 1 sessions           Subjective Questionnaire: LEFS:       Subjective Evaluation    History of Present Illness  Date of onset: 2023  Mechanism of injury: Patient reports her hip started hurting about 6 months ago but has gotten really bad about one month ago.  Heat and ice help but temporary.    Reports no prior history of injury to hip.      PMH: gastric bypass, ovarian cysts removed, oophorectomy, ovarian remnant removed, bowel obstruction-hernia repair and scar tissue release, anemia, anxiety, depression, GERD.     Subjective comment: Patient reports right hip pain.  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    Treatments  Current treatment: injection treatment and medication  Patient Goals  Patient goals for therapy: decreased pain             Objective          Palpation     Right Tenderness of the gluteus medius, piriformis, rectus femoris and TFL.     Tenderness     Right Hip   Tenderness in the ASIS, greater trochanter, iliac crest and sacroiliac  joint.     Lumbar Screen  Lumbar range of motion within normal limits.    Active Range of Motion   Left Hip   Normal active range of motion    Right Hip   Normal active range of motion    Additional Active Range of Motion Details  Right hip ROM is pain guarded    Strength/Myotome Testing     Right Hip   Planes of Motion   Flexion: 4-  Extension: 4-  Abduction: 4-  External rotation: 4-  Internal rotation: 4-    Tests     Right Hip   Positive piriformis.   SLR: Positive.     Additional Tests Details  SLR: Left 70 degrees, Right 50 degrees    SIJ mal-alignment    Functional Assessment   Squat   Pain and sitting toward left side.     Single Leg Stance   Left: 30 seconds  Right: 10 seconds          Assessment & Plan       Assessment  Impairments: abnormal gait, abnormal muscle tone, activity intolerance, impaired physical strength, lacks appropriate home exercise program and pain with function   Functional limitations: sleeping, walking, uncomfortable because of pain, sitting and standing (standing up from sitting, laying on side)  Assessment details: Leatha Burkett is a pleasant 42 y.o. female that presents with normal lumbar and right hip ROM, mild strength limitations in right hip, SIJ mal-alignment, lumbo-pelvic muscle imbalance and pain limited functional task performance.   Pt will benefit from skilled PT services in order to address listed impairments, decrease pain and restore function.    Prognosis: good  Prognosis details: Patient demonstrates good rehab potential as evidenced by high motivation to participate with PT POC and to return to PLOF/ADLs/IADLs.    Goals  Plan Goals: Short Term Goals (3 wks):  1.  Patient will have pain free right hip ROM.  2.  Patient will have increased SLR to 70 degrees.  3.  Patient will have symmetrical SIJ alignment.  4.  Patient will have increased LE muscle flexibility to WNLs.      Long Term Goals (6 wks):  1.  Patient will be independent in performance of HEP.  2.  Patient  will have improved LEFS score of 40/80 or better..  3.  Patient will have increased right hip strength to 4+/5.  4.  Patient will have increased SLS time of 30 seconds.        Plan  Therapy options: will be seen for skilled therapy services  Planned modality interventions: cryotherapy, ultrasound, dry needling and TENS  Planned therapy interventions: manual therapy, soft tissue mobilization, strengthening, stretching, joint mobilization, flexibility, functional ROM exercises, gait training, home exercise program, neuromuscular re-education, therapeutic activities, body mechanics training and abdominal trunk stabilization  Frequency: 2x week  Duration in weeks: 6  Treatment plan discussed with: patient  Plan details: Pt was educated on the importance of their HEP and their current need for continued skilled physical therapy. Patients goals and potential limitations were discussed and pt is in agreement with current plan of care and treatment emphasis.              History # of Personal Factors and/or Comorbidities: HIGH (3+)  Examination of Body System(s): # of elements: MODERATE (3)  Clinical Presentation: STABLE   Clinical Decision Making: MODERATE      Timed:         Manual Therapy:         mins  86507;     Therapeutic Exercise:    15     mins  68644;     Neuromuscular Sanjay:        mins  59184;    Therapeutic Activity:     10     mins  73744;     Gait Training:           mins  30650;     Ultrasound:          mins  76928;    Ionto                                  mins  95753  Self Care                            mins  32447  Canalith Repos         mins  76813  Orthotic MGMT/Train         mins  04207    Un-Timed:  Electrical Stimulation:         mins  05521 ( );  Dry Needling:          mins  51994 self-pay;  Dry Needling:          mins  49420 self-pay  Traction          mins  03098  Low Eval          mins  10115  Mod Eval     30     mins  88035  High Eval                            mins  21737    Timed  Treatment:   25   mins   Total Treatment:     55   mins      PT SIGNATURE: Mignon Arguello PT     License Number: [id number]  Electronically signed by Mignon Arguello PT, 12/18/23, 8:33 AM EST      DATE TREATMENT INITIATED: 12/18/2023    Initial Certification  Certification Period: 12/18/2023 thru 3/16/2024  I certify that the therapy services are furnished while this patient is under my care.  The services outlined above are required by this patient, and will be reviewed every 90 days.     PHYSICIAN: Javed Tejada MD      NPI: [id number]  DATE:         Please sign and return via fax to [phone]. Thank you, [facility].

## 2023-12-18 NOTE — PATIENT INSTRUCTIONS
Access Code: [email or web address]  URL: [email or web address]  Date: 12/18/2023  Prepared by: Mignon Arguello    Exercises  - Supine Hip Adduction Isometric with Ball  - 1 x daily - 7 x weekly - 1 sets - 10 reps - 5 sec. hold  - Supine Straight Leg Hip Adduction and Quad Set with Ball  - 1 x daily - 7 x weekly - 1 sets - 10 reps - 5 sec. hold  - Sidelying Thoracic and Shoulder Rotation  - 1 x daily - 7 x weekly - 1 sets - 10 reps

## 2023-12-29 ENCOUNTER — TELEPHONE (OUTPATIENT)
Age: 43
End: 2023-12-29

## 2023-12-29 NOTE — TELEPHONE ENCOUNTER
Caller: Leatha Burkett    Relationship: Self    What was the call regarding: PATIENT WILL NOT BE AT TODAY'S APPT.  SHE WOKE UP WITH A FEVER.

## 2024-01-03 ENCOUNTER — OFFICE VISIT (OUTPATIENT)
Dept: OBSTETRICS AND GYNECOLOGY | Age: 44
End: 2024-01-03
Payer: COMMERCIAL

## 2024-01-03 VITALS
WEIGHT: 196.2 LBS | HEIGHT: 63 IN | BODY MASS INDEX: 34.76 KG/M2 | SYSTOLIC BLOOD PRESSURE: 112 MMHG | DIASTOLIC BLOOD PRESSURE: 74 MMHG

## 2024-01-03 DIAGNOSIS — Z12.4 SCREENING FOR MALIGNANT NEOPLASM OF CERVIX: ICD-10-CM

## 2024-01-03 DIAGNOSIS — R30.0 DYSURIA: ICD-10-CM

## 2024-01-03 DIAGNOSIS — Z13.89 SCREENING FOR BLOOD OR PROTEIN IN URINE: ICD-10-CM

## 2024-01-03 DIAGNOSIS — Z11.51 SCREENING FOR HUMAN PAPILLOMAVIRUS (HPV): ICD-10-CM

## 2024-01-03 DIAGNOSIS — Z01.419 WELL FEMALE EXAM WITH ROUTINE GYNECOLOGICAL EXAM: Primary | ICD-10-CM

## 2024-01-03 LAB
BILIRUB BLD-MCNC: NEGATIVE MG/DL
CLARITY, POC: CLEAR
COLOR UR: YELLOW
GLUCOSE UR STRIP-MCNC: NEGATIVE MG/DL
KETONES UR QL: NEGATIVE
LEUKOCYTE EST, POC: ABNORMAL
NITRITE UR-MCNC: NEGATIVE MG/ML
PH UR: 5.5 [PH] (ref 5–8)
PROT UR STRIP-MCNC: NEGATIVE MG/DL
RBC # UR STRIP: ABNORMAL /UL
SP GR UR: 1.03 (ref 1–1.03)
UROBILINOGEN UR QL: NORMAL

## 2024-01-03 RX ORDER — NITROFURANTOIN 25; 75 MG/1; MG/1
100 CAPSULE ORAL 2 TIMES DAILY
Qty: 10 CAPSULE | Refills: 0 | Status: SHIPPED | OUTPATIENT
Start: 2024-01-03 | End: 2024-01-08

## 2024-01-03 RX ORDER — FLUCONAZOLE 150 MG/1
150 TABLET ORAL DAILY
Qty: 2 TABLET | Refills: 0 | Status: SHIPPED | OUTPATIENT
Start: 2024-01-03

## 2024-01-03 RX ORDER — SEMAGLUTIDE 2.4 MG/.75ML
0.75 INJECTION, SOLUTION SUBCUTANEOUS
COMMUNITY
Start: 2023-12-28 | End: 2024-03-27

## 2024-01-03 NOTE — PROGRESS NOTES
[facility]   Obstetrics and Gynecology     1/3/2024    Patient: Leatha Burkett          MR#:[id number]    History of Present Illness    Chief Complaint   Patient presents with    Gynecologic Exam     Annual exam, last pap ?, last mammo 10/3/23 (-), c-scope 2/26/15, pt states she thinks she may have an UTI, she states she started having burning with urination and feeling like her bladder is not getting emptied yesterday       43 y.o. female  who presents for annual exam. Have not had cycles since ovaries were removed  She has not had a cycle since her oophorectomy she did take provera and had a withdrawal bleeding this past year  No hot flashes or other sx   Takes multivitamin  Exercises when she can  Works at [employer] as a   Colonoscopy good for 3 years   Needs to have diagnostic mammogram and breast US has orders placed   Has completed genetic testing due to family hx negative apart from vus       Relevant data reviewed:    No LMP recorded (lmp unknown). (Menstrual status: Oopherectomy).  Obstetric History:  OB History          3    Para   2    Term   1       1    AB   1    Living   2         SAB   1    IAB        Ectopic        Molar        Multiple        Live Births   2          Obstetric Comments   Hx incompetent cervix, had cerclage with last 2 pregnancies              Menstrual History:     No LMP recorded (lmp unknown). (Menstrual status: Oopherectomy).       Social History     Substance and Sexual Activity   Sexual Activity Yes    Partners: Female    Birth control/protection: Same-sex partner     ______________________________________  Patient Active Problem List   Diagnosis    Obesity, Class II, BMI 35-39.9    Anemia    S/P gastric bypass    Surgical menopause    Family history of cancer    Abnormal finding on breast imaging     Past Medical History:   Diagnosis Date    Anemia 2016    Anxiety     Bariatric surgery status     Depression     Endometriosis  11/12/2021    Essential hypertension, benign     Fibroid 11/12/2021    GERD (gastroesophageal reflux disease)     Hernia 2012    Migraine     Multiple gestation     Obesity     МАРИНА (obstructive sleep apnea)     PCOS (polycystic ovarian syndrome)     PMS (premenstrual syndrome)     Polycystic ovary syndrome 1999    Ulcer 2022     Past Surgical History:   Procedure Laterality Date    ABDOMINAL SURGERY  2002    BARIATRIC SURGERY  2003    BREAST BIOPSY  2013    COLONOSCOPY  2022    D & C WITH SUCTION  1999    DILATATION AND CURETTAGE  1999    ENDOSCOPY  2022    GASTRIC BYPASS  2003    GASTRIC SLEEVE LAPAROSCOPIC  2003    HERNIA REPAIR  2012    HYSTEROSCOPY      OOPHORECTOMY Right 2004    OOPHORECTOMY  2021    OVARIAN CYST SURGERY      OVARY SURGERY Bilateral     right removed 2006  left removed 2021    UPPER GASTROINTESTINAL ENDOSCOPY      WISDOM TOOTH EXTRACTION       Social History     Tobacco Use   Smoking Status Never   Smokeless Tobacco Never     Family History   Problem Relation Age of Onset    Hypertension Father     Cancer Father         Pancreatic    Diabetes Father     Heart disease Father     Pancreatic cancer Father     Sleep apnea Father     Heart disease Mother     Hypertension Mother     Hyperlipidemia Mother     Cancer Brother         Lung/smoker    Early death Brother     Lung cancer Brother     Diabetes Sister     Heart disease Sister         Heart attack 50s    Heart attack Sister     Heart attack Paternal Grandfather     Stroke Paternal Grandmother     Colon cancer Maternal Grandmother     Cancer Maternal Grandmother         Colon (did not cause death)    Colon cancer Maternal Grandfather     Cancer Maternal Grandfather         Colon    Colon cancer Maternal Uncle     Ovarian cancer Other     Breast cancer Neg Hx     Uterine cancer Neg Hx      Prior to Admission medications    Medication Sig Start Date End Date Taking? Authorizing Provider   buPROPion SR (WELLBUTRIN SR) 150 MG 12 hr tablet Take 1  tablet by mouth Every 12 (Twelve) Hours. 7/31/23  Yes Oni Mortensen MD   metFORMIN (GLUCOPHAGE) 1000 MG tablet TAKE 1 TABLET BY MOUTH DAILY WITH BREAKFAST 12/1/23  Yes Chitra Hinojosa APRN   multivitamin (THERAGRAN) tablet tablet Take 1 tablet by mouth.   Yes Provider, Historical, MD   Wegovy 2.4 MG/0.75ML solution auto-injector Inject 0.75 mL under the skin into the appropriate area as directed Every 7 (Seven) Days. 12/28/23 3/27/24 Yes Oni Mortensen MD   BD Pen Needle Micro U/F 32G X 6 MM misc  7/31/23 1/3/24  Oni Mortensen MD   Liraglutide (SAXENDA) 18 MG/3ML injection pen 0.6 mg subcut Route daily x 1 week, increase dose by 0.6 mg weekly as tolerated to reach 3 mg dose 7/31/23 1/3/24  Oni Mortensen MD   meloxicam (MOBIC) 15 MG tablet 1 PO Daily with food. 8/22/23 1/3/24  Lena Swift MD   methylPREDNISolone (MEDROL) 4 MG dose pack Take as directed on package instructions. 8/2/23 1/3/24  Chitra Hinojosa APRN   naltrexone-bupropion ER (Contrave) 8-90 MG tablet 2 tab PO BID.  Patient not taking: Reported on 8/2/2023 5/3/23 1/3/24  Oni Mortensen MD   pantoprazole (PROTONIX) 40 MG EC tablet Take 1 tablet by mouth Daily. 7/13/23 1/3/24  Adrián Stovall MD   sucralfate (CARAFATE) 1 g tablet Take 1 tablet by mouth 4 (Four) Times a Day.  Patient not taking: Reported on 11/16/2023 7/13/23 1/3/24  Adrián Stovall MD Wegovy 1.7 MG/0.75ML solution auto-injector Inject 0.75 mL under the skin into the appropriate area as directed Every 7 (Seven) Days. 11/4/23 1/3/24  Oni Mortensen MD     _______________________________________    Current contraception:  oophorectomy   History of abnormal Pap smear: no  Family history of uterine or ovarian cancer: yes - great aunt  Family History of colon cancer/colon polyps: yes - grandfather  Family History of Breast Cancer:no  History of abnormal mammogram: yes - benign has had follow up  History of abnormal lipids:  "no  Gardasil vaccine: no  Vit D and Calcium:  yes - daily       The following portions of the patient's history were reviewed and updated as appropriate: allergies, current medications, past family history, past medical history, past social history, past surgical history, and problem list.    Review of Systems    Pertinent items are noted in HPI.       Objective   Physical Exam    /74   Ht 160 cm (62.99\")   Wt 89 kg (196 lb 3.2 oz)   LMP  (LMP Unknown)   BMI 34.77 kg/m²    BP Readings from Last 3 Encounters:   24 112/74   23 118/84   23 113/81      Wt Readings from Last 3 Encounters:   24 89 kg (196 lb 3.2 oz)   23 92.1 kg (203 lb 1.6 oz)   23 93.8 kg (206 lb 11.2 oz)        BMI: Estimated body mass index is 34.77 kg/m² as calculated from the following:    Height as of this encounter: 160 cm (62.99\").    Weight as of this encounter: 89 kg (196 lb 3.2 oz).       General: alert, appears stated age, and cooperative   Heart: regular rate and rhythm, S1, S2 normal, no murmur, click, rub or gallop   Lungs: clear to auscultation bilaterally   Abdomen: soft, non-tender, without masses, no organomegaly   Breast: inspection negative, no nipple discharge or bleeding, no masses or nodularity palpable   External genitalia/Vulva: External genitalia including bartholin's glands, Urethra, Tohatchi's gland and urethra meatus are normal, Perineum, rectum and anus appear normal , and Bladder appears normal without significant prolapse    Vagina: normal mucosa, normal discharge   Cervix: no lesions   Uterus: normal size and non-tender   Adnexa: no mass, fullness, tenderness     As part of wellness and prevention, the following topics were discussed with the patient:  Encouraged self breast exam        Problem List   Meds  History  Prep for Surg   Imagin}    Assessment:  Diagnoses and all orders for this visit:    1. Well female exam with routine gynecological exam (Primary)  -     IGP, " Apt HPV,rfx 16 / 18,45    2. Screening for blood or protein in urine  -     POC Urinalysis Dipstick  -     Urine Culture - Urine, Urine, Clean Catch    3. Dysuria  -     nitrofurantoin, macrocrystal-monohydrate, (Macrobid) 100 MG capsule; Take 1 capsule by mouth 2 (Two) Times a Day for 5 days.  Dispense: 10 capsule; Refill: 0    4. Screening for human papillomavirus (HPV)  -     IGP, Apt HPV,rfx 16 / 18,45    5. Screening for malignant neoplasm of cervix  -     IGP, Apt HPV,rfx 16 / 18,45    Other orders  -     fluconazole (Diflucan) 150 MG tablet; Take 1 tablet by mouth Daily.  Dispense: 2 tablet; Refill: 0      Plan: pap this year will treat based off s/s for UTI, culture sent she gets yeast infections with antibiotics medication sent for this, last labs she had were menopausal range with Dr Jones she prefers to stay off hrt All of the patient's questions were addressed and answered, I have encouraged her to call for today's test results if she has not received them within 10 days.  Patient is advised to call with any change in her condition or with any other questions, otherwise return in 12 months for annual examination. Encouraged vitamin D 800mg supplement and 1200mg calcium supplement over the counter incorporate 150 minutes of aerobic exercise weekly with strength training  No follow-ups on file.    Nydia Velasco, APRN  1/3/2024 15:14 EST

## 2024-01-04 ENCOUNTER — TREATMENT (OUTPATIENT)
Age: 44
End: 2024-01-04
Payer: COMMERCIAL

## 2024-01-04 DIAGNOSIS — M70.61 GREATER TROCHANTERIC BURSITIS OF RIGHT HIP: Primary | ICD-10-CM

## 2024-01-04 PROCEDURE — 97110 THERAPEUTIC EXERCISES: CPT | Performed by: PHYSICAL THERAPIST

## 2024-01-04 PROCEDURE — 97140 MANUAL THERAPY 1/> REGIONS: CPT | Performed by: PHYSICAL THERAPIST

## 2024-01-04 NOTE — PROGRESS NOTES
Physical Therapy Treatment Note  [facility] [place]   [address] [place] [address]  P: [phone]       F: [phone]      Patient: Leatha Burkett   : 1980  Treatment Diagnosis:     ICD-10-CM ICD-9-CM   1. Greater trochanteric bursitis of right hip  M70.61 726.5     Referring practitioner: Javed Tejada MD  Date of Initial Visit: Type: THERAPY  Noted: 2023  Today's Date: 2024  Patient seen for 2 sessions           Subjective   Patient reports she was sick for a week and pretty much did not get out of bed and had less pain. States the pain returned when she went back to work.      Objective     See Exercise, Manual, and Modality Logs for complete treatment.       Assessment/Plan  Patient responded positively to manual therapy with improved right hip mobility and improved right hip girdle muscle tone and decreased muscle TTP.  Progressed HEP with hip stretches.  Progress per Plan of Care and Progress strengthening /stabilization /functional activity           Timed:         Manual Therapy:    12     mins  20951;     Therapeutic Exercise:    15     mins  89832;     Neuromuscular Sanjay:        mins  35409;    Therapeutic Activity:          mins  25893;     Gait Training:           mins  45661;     Ultrasound:          mins  08912;    Ionto                                  mins  35098  Self Care                            mins  75647  Canalith Repos         mins  17406  Orthotic MGMT/Train         mins  86485    Un-Timed:  Electrical Stimulation:         mins  75883 ( );  Dry Needling:          mins  37533 self-pay;  Dry Needling:          mins  52759 self-pay  Traction          mins  94298      Timed Treatment:   27   mins   Total Treatment:     27   mins        PT SIGNATURE: Mignon Arguello PT     License Number: [id number]  Electronically signed by Mignon Arguello PT, 24, 3:23 PM EST

## 2024-01-05 LAB
BACTERIA UR CULT: ABNORMAL
BACTERIA UR CULT: ABNORMAL
CYTOLOGIST CVX/VAG CYTO: NORMAL
CYTOLOGY CVX/VAG DOC CYTO: NORMAL
CYTOLOGY CVX/VAG DOC THIN PREP: NORMAL
DX ICD CODE: NORMAL
HIV 1 & 2 AB SER-IMP: NORMAL
HPV I/H RISK 4 DNA CVX QL PROBE+SIG AMP: NEGATIVE
OTHER ANTIBIOTIC SUSC ISLT: ABNORMAL
OTHER STN SPEC: NORMAL
STAT OF ADQ CVX/VAG CYTO-IMP: NORMAL

## 2024-01-08 ENCOUNTER — TELEPHONE (OUTPATIENT)
Dept: ONCOLOGY | Facility: CLINIC | Age: 44
End: 2024-01-08

## 2024-01-08 NOTE — TELEPHONE ENCOUNTER
Caller: Leatha Burkett    Relationship to patient: Self    Best call back number: [phone]    Type of visit: VITALS AND F/U APPT    Requested date: PREFERS WEDNESDAYS    If rescheduling, when is the original appointment: 12/20/23

## 2024-01-09 ENCOUNTER — TREATMENT (OUTPATIENT)
Age: 44
End: 2024-01-09
Payer: COMMERCIAL

## 2024-01-09 DIAGNOSIS — M70.61 GREATER TROCHANTERIC BURSITIS OF RIGHT HIP: Primary | ICD-10-CM

## 2024-01-09 PROCEDURE — 97140 MANUAL THERAPY 1/> REGIONS: CPT | Performed by: PHYSICAL THERAPIST

## 2024-01-09 PROCEDURE — 97112 NEUROMUSCULAR REEDUCATION: CPT | Performed by: PHYSICAL THERAPIST

## 2024-01-09 PROCEDURE — 97110 THERAPEUTIC EXERCISES: CPT | Performed by: PHYSICAL THERAPIST

## 2024-01-09 NOTE — PROGRESS NOTES
Physical Therapy Treatment Note  [facility] [place]   [address] [place] [address]  P: [phone]       F: [phone]      Patient: Leatha Burkett   : 1980  Treatment Diagnosis:     ICD-10-CM ICD-9-CM   1. Greater trochanteric bursitis of right hip  M70.61 726.5     Referring practitioner: Javed Tejada MD  Date of Initial Visit: Type: THERAPY  Noted: 2023  Today's Date: 2024  Patient seen for 3 sessions           Subjective   Patient reports she is having less pain in her hip.  Has not been having pain during the day as much.  Still hurts at the end of the day to lay on her right side.    Objective     See Exercise, Manual, and Modality Logs for complete treatment.       Assessment/Plan  Patient responded positively to manual therapy with improved right hip mobility and decreased discomfort.  Performed exercises without symptom provocation.  Benefits from cueing for technique and to prevent compensatory patterns.  Will continue to progress as tolerated.  Progress per Plan of Care and Progress strengthening /stabilization /functional activity           Timed:         Manual Therapy:    10     mins  05194;     Therapeutic Exercise:    15     mins  85962;     Neuromuscular Sanjay:    8    mins  52459;    Therapeutic Activity:          mins  13503;     Gait Training:           mins  88599;     Ultrasound:          mins  16688;    Ionto                                  mins  98164  Self Care                            mins  82385  Canalith Repos         mins  05184  Orthotic MGMT/Train         mins  78902    Un-Timed:  Electrical Stimulation:         mins  51727 ( );  Dry Needling:          mins  12984 self-pay;  Dry Needling:          mins  41109 self-pay  Traction          mins  07655      Timed Treatment:   33   mins   Total Treatment:     33   mins        PT SIGNATURE: Mignon Arguello PT     License Number: [id number]  Electronically signed by  Mignon Arguello, PT, 01/09/24, 4:01 PM EST

## 2024-01-09 NOTE — TELEPHONE ENCOUNTER
Caller: Patricio Burkett    Relationship: Self    Best call back number: [phone]    What was the call regarding: PATRICIO WAS RETURNING DESTINY'S CALL.

## 2024-01-17 ENCOUNTER — OFFICE VISIT (OUTPATIENT)
Dept: ONCOLOGY | Facility: CLINIC | Age: 44
End: 2024-01-17
Payer: COMMERCIAL

## 2024-01-17 VITALS
DIASTOLIC BLOOD PRESSURE: 107 MMHG | BODY MASS INDEX: 35.31 KG/M2 | WEIGHT: 199.3 LBS | HEART RATE: 78 BPM | OXYGEN SATURATION: 99 % | SYSTOLIC BLOOD PRESSURE: 137 MMHG | TEMPERATURE: 97.8 F | RESPIRATION RATE: 18 BRPM | HEIGHT: 63 IN

## 2024-01-17 DIAGNOSIS — D64.9 ANEMIA, UNSPECIFIED TYPE: Primary | ICD-10-CM

## 2024-01-17 PROCEDURE — 99214 OFFICE O/P EST MOD 30 MIN: CPT | Performed by: INTERNAL MEDICINE

## 2024-01-17 NOTE — PROGRESS NOTES
.     REASON FOR FOLLOWUP :   Iron deficiency anemia    HISTORY OF PRESENT ILLNESS:  The patient is a 43 y.o. year old female  who is here for follow-up with the above-mentioned history.    States she saw all of the GI doctor and her GERD medication was changed and this helped a great deal.  No new problems.  No bleeding, chest pain, SOA    Past Medical History:   Diagnosis Date    Anemia 2016    Anxiety     Bariatric surgery status     Depression     Endometriosis 11/12/2021    Essential hypertension, benign     Fibroid 11/12/2021    GERD (gastroesophageal reflux disease)     Hernia 2012    Migraine     Multiple gestation     Obesity     МАРИНА (obstructive sleep apnea)     PCOS (polycystic ovarian syndrome)     PMS (premenstrual syndrome)     Polycystic ovary syndrome 1999    Ulcer 2022     Past Surgical History:   Procedure Laterality Date    ABDOMINAL SURGERY  2002    BARIATRIC SURGERY  2003    BREAST BIOPSY  2013    COLONOSCOPY  2022    D & C WITH SUCTION  1999    DILATATION AND CURETTAGE  1999    ENDOSCOPY  2022    GASTRIC BYPASS  2003    GASTRIC SLEEVE LAPAROSCOPIC  2003    HERNIA REPAIR  2012    HYSTEROSCOPY      OOPHORECTOMY Right 2004    OOPHORECTOMY  2021    OVARIAN CYST SURGERY      OVARY SURGERY Bilateral     right removed 2006  left removed 2021    UPPER GASTROINTESTINAL ENDOSCOPY      WISDOM TOOTH EXTRACTION         MEDICATIONS    Current Outpatient Medications:     buPROPion SR (WELLBUTRIN SR) 150 MG 12 hr tablet, Take 1 tablet by mouth Every 12 (Twelve) Hours., Disp: , Rfl:     fluconazole (Diflucan) 150 MG tablet, Take 1 tablet by mouth Daily., Disp: 2 tablet, Rfl: 0    metFORMIN (GLUCOPHAGE) 1000 MG tablet, TAKE 1 TABLET BY MOUTH DAILY WITH BREAKFAST, Disp: 90 tablet, Rfl: 0    multivitamin (THERAGRAN) tablet tablet, Take 1 tablet by mouth., Disp: , Rfl:     Wegovy 2.4 MG/0.75ML solution auto-injector, Inject 0.75 mL under the skin into the appropriate area as directed Every 7 (Seven) Days., Disp:  , Rfl:     ALLERGIES:   No Known Allergies    SOCIAL HISTORY:       Social History     Socioeconomic History    Marital status:      Spouse name: Galilea    Number of children: 2   Tobacco Use    Smoking status: Never    Smokeless tobacco: Never   Vaping Use    Vaping Use: Never used   Substance and Sexual Activity    Alcohol use: Not Currently     Comment: socially    Drug use: Never    Sexual activity: Yes     Partners: Female     Birth control/protection: Same-sex partner         FAMILY HISTORY:  Family History   Problem Relation Age of Onset    Hypertension Father     Cancer Father         Pancreatic    Diabetes Father     Heart disease Father     Pancreatic cancer Father     Sleep apnea Father     Heart disease Mother     Hypertension Mother     Hyperlipidemia Mother     Cancer Brother         Lung/smoker    Early death Brother     Lung cancer Brother     Diabetes Sister     Heart disease Sister         Heart attack 50s    Heart attack Sister     Heart attack Paternal Grandfather     Stroke Paternal Grandmother     Colon cancer Maternal Grandmother     Cancer Maternal Grandmother         Colon (did not cause death)    Colon cancer Maternal Grandfather     Cancer Maternal Grandfather         Colon    Colon cancer Maternal Uncle     Ovarian cancer Other     Breast cancer Neg Hx     Uterine cancer Neg Hx        REVIEW OF SYSTEMS:  Review of Systems   Constitutional:  Negative for activity change.   HENT:  Negative for nosebleeds and trouble swallowing.    Respiratory:  Negative for shortness of breath and wheezing.    Cardiovascular:  Negative for chest pain and palpitations.   Gastrointestinal:  Negative for constipation, diarrhea and nausea.   Genitourinary:  Negative for dysuria and hematuria.   Musculoskeletal:  Negative for arthralgias and myalgias.   Skin:  Negative for rash and wound.   Neurological:  Negative for seizures and syncope.   Hematological:  Negative for adenopathy. Does not bruise/bleed  "easily.   Psychiatric/Behavioral:  Negative for confusion.               Vitals:    01/17/24 1018   BP: (!) 137/107   Pulse: 78   Resp: 18   Temp: 97.8 °F (36.6 °C)   TempSrc: Temporal   SpO2: 99%   Weight: 90.4 kg (199 lb 4.8 oz)   Height: 160 cm (62.99\")   PainSc: 0-No pain         1/17/2024    10:18 AM   Current Status   ECOG score 0      PHYSICAL EXAM:        CONSTITUTIONAL:  Vital signs reviewed.  No distress, looks comfortable.  EYES:  Conjunctiva and lids unremarkable.  PERRLA  EARS,NOSE,MOUTH,THROAT:  Ears and nose appear unremarkable.  Lips, teeth, gums appear unremarkable.  RESPIRATORY:  Normal respiratory effort.  Lungs clear to auscultation bilaterally.  CARDIOVASCULAR:  Normal S1, S2.  No murmurs rubs or gallops.  No significant lower extremity edema.  GASTROINTESTINAL: Abdomen appears unremarkable.  Nontender.  No hepatomegaly.  No splenomegaly.  LYMPHATIC:  No cervical, supraclavicular, axillary lymphadenopathy.  SKIN:  Warm.  No rashes.  PSYCHIATRIC:  Normal judgment and insight.  Normal mood and affect.        RECENT LABS:        WBC   Date Value Ref Range Status   12/18/2023 7.10 3.40 - 10.80 10*3/mm3 Final   05/03/2023 6.81 3.40 - 10.80 10*3/mm3 Final   11/02/2022 7.02 3.40 - 10.80 10*3/mm3 Final   09/19/2022 5.92 3.40 - 10.80 10*3/mm3 Final     Hemoglobin   Date Value Ref Range Status   12/18/2023 13.9 12.0 - 15.9 g/dL Final   05/03/2023 13.6 12.0 - 15.9 g/dL Final   11/02/2022 14.8 12.0 - 15.9 g/dL Final   09/19/2022 14.4 12.0 - 15.9 g/dL Final     Platelets   Date Value Ref Range Status   12/18/2023 276 140 - 450 10*3/mm3 Final   05/03/2023 301 140 - 450 10*3/mm3 Final   11/02/2022 248 140 - 450 10*3/mm3 Final   09/19/2022 264 140 - 450 10*3/mm3 Final       Assessment & Plan   There are no diagnoses linked to this encounter.      Leatha Burkett   *Iron deficiency anemia  History of IV iron, which typically improves body cramps/aches/fatigue  11/2/2022: Transferred care to our practice after " moving to [place] from [place].  Previously under the care of Dr. Marquise Huerta, hematology oncology at [facility] in [place], [place].  He reported she was needing IV iron on average annually  Cannot tolerate oral iron due to constipation and abdominal pain.  Also oral iron is not effective due to poor absorption (has not been effective in the past and she has gastric bypass, which is felt to decrease absorption)  9/19/2022: Ferritin 241, serum iron 104 (normal iron saturation).  Hb 14.4.  MCV 91.6.  No need for IV iron at this time.  5/3/2023: Ferritin 162, 32% saturation, Hb 13.6.  No need for IV iron at this time  12/18/2023: Ferritin 140, 25% saturation, Hb 13.9.  No need for IV iron.    *Source of iron deficiency  Gastric bypass 2003, [facility]  History of menorrhagia.  First ovary removed 2006.  Second ovary removed November 2021.  (Ovaries were removed due to cysts.)    No vaginal bleeding since then.  Patient's last EGD and colonoscopy April 2021.  She states she was told to have the next 5 years later.  5/10/2023: Patient states ongoing GERD despite omeprazole and Carafate.  She states last EGD showed ulcers.  She would like to see a GI in [place].  Last EGD was through [facility].    *B12 deficiency, since gastric bypass surgery.  Monthly IM B12 injections at home  B12 819 on 9/19/2022.  B12 399 on 5/3/2023.  She reported she forgot to do the B12 monthly self injections and has not been on these for the past 6 months.  Encouraged to resume  12/18/2023: B12 178 (low).  She forgets to take the self IM injections.  Therefore, changed to B12 1000 mcg oral daily (1000 mcg IM injection x 1 in the office today).    *Gastric bypass surgery, 2003 (lost 150 pounds).  RBC folate normal, 1170, on 5/3/2023    *Family history of colon cancer  Mom age 48, maternal grandmother age 45, maternal grandfather age 68.  Patient's last EGD and colonoscopy April 2021.  She states she was told to have the next 5  years later.  She declines genetic counseling but knows it is available    *Class II obesity.  Being overweight can lead to cytopenias through hepatic steatosis.  Body mass index is 35.31 kg/m².  BMI 25 to <30 is overweight  BMI 30 to <35 is class 1 obesity  BMI 35 to <40 is class 2 obesity  BMI 40 or higher is class 3 obesity   Remains overweight.  Ideally, lose weight    Plan  MD 6 months.  1 week prior: Ferritin, iron panel, CBC, reticulate hemoglobin, B12 level, RBC folate  (had also been getting folate checked).  Vitamin B12 1000 mcg oral daily  Vitamin B12 1000 mcg IM x 1 in the office today  I offered sooner follow-up in 6 months considering her B12 is low.  She declines which is reasonable as it is likely her B12 will be taking care of by improving compliance (by switching to oral since she forgets to take the monthly self injections).

## 2024-01-18 ENCOUNTER — TREATMENT (OUTPATIENT)
Age: 44
End: 2024-01-18
Payer: COMMERCIAL

## 2024-01-18 DIAGNOSIS — M70.61 GREATER TROCHANTERIC BURSITIS OF RIGHT HIP: Primary | ICD-10-CM

## 2024-01-18 NOTE — PROGRESS NOTES
Physical Therapy Re-Assessment  [facility] [place]   [address] [place] [address]  P: [phone]       F: [phone]        Patient: Leatha Burkett   : 1980  Visit Diagnoses:     ICD-10-CM ICD-9-CM   1. Greater trochanteric bursitis of right hip  M70.61 726.5     Referring practitioner: Javed eTjada MD  Date of Initial Visit: Type: THERAPY  Noted: 2023  Today's Date: 2024  Patient seen for 4 sessions      Subjective:   Leatha Burkett reports:   Subjective Questionnaire: LEFS:   Clinical Progress: improved  Home Program Compliance: Yes  Treatment has included: therapeutic exercise, neuromuscular re-education, manual therapy, and therapeutic activity    Subjective   Patient reports her hip has been feeling better but the past few days with the increased cold she has had increased pain.  Intensity was 6-7/10 last night.  Heating pad and stretching helped a little bit.    Objective     Palpation      Right Tenderness of the gluteus medius, piriformis, rectus femoris and TFL.      Tenderness      Right Hip   Tenderness in the ASIS, greater trochanter, iliac crest and sacroiliac joint.      Lumbar Screen  Lumbar range of motion within normal limits.     Active Range of Motion   Left Hip   Normal active range of motion     Right Hip   Normal active range of motion     Additional Active Range of Motion Details  Right hip ROM is pain guarded     Strength/Myotome Testing      Right Hip   Planes of Motion   Flexion: 4-  Extension: 4  Abduction: 4-  External rotation: 4  Internal rotation: 4-     Tests      Right Hip   Negative piriformis.   SLR: Negative.      Additional Tests Details  SLR: Left 70 degrees, Right 70 degrees     Symmetrical SIJ alignment     Functional Assessment   Squat   Pain and sitting toward left side.      Single Leg Stance   Left: 30 seconds  Right: 21 seconds        See Exercise, Manual, and Modality Logs for complete treatment.      Assessment/Plan  Patient presents with improved SIJ alignment, LE flexibility and SLS.  She still has limitations in hip mobility, strength and balance.  She is responding positively to treatment and will benefit from continued PT.    Progress toward previous goals: Partially Met    Goal Review   Short Term Goals (2 wks):  1.  Patient will have pain free right hip ROM.-partially met  2.  Patient will have increased SLR to 70 degrees.-met  3.  Patient will have symmetrical SIJ alignment.-met  4.  Patient will have increased LE muscle flexibility to WNLs.-progressing        Long Term Goals (4 wks):  1.  Patient will be independent in performance of HEP.-progressing  2.  Patient will have improved LEFS score of 40/80 or better.-met  3.  Patient will have increased right hip strength to 4+/5.-progressing  4.  Patient will have increased SLS time of 30 seconds.-progressing         Recommendations: Continue as planned  Timeframe: 1 month  Prognosis to achieve goals: good    PT SIGNATURE: Mignon Arguello PT     License Number: [id number]  Electronically signed by Mignon Arguello PT, 01/18/24, 3:33 PM EST        Timed:         Manual Therapy:    10     mins  22987;     Therapeutic Exercise:    15     mins  52834;     Neuromuscular Sanjay:        mins  09941;    Therapeutic Activity:    10      mins  32040;     Gait Training:           mins  94596;     Ultrasound:          mins  02313;    Ionto                                  mins  04172  Self Care                            mins  10531  Canalith Repos         mins  20421  Orthotic MGMT/Train         mins  03015    Un-Timed:  Electrical Stimulation:         mins  07864 ( );  Dry Needling:          mins  75176 self-pay;  Dry Needling:          mins  04333 self-pay  Traction          mins  25773  Low Eval          mins  07152  Mod Eval          mins  07853  High Eval                            mins  00692    Timed Treatment:   35   mins   Total Treatment:     35    mins

## 2024-01-25 ENCOUNTER — TREATMENT (OUTPATIENT)
Age: 44
End: 2024-01-25
Payer: COMMERCIAL

## 2024-01-25 DIAGNOSIS — M70.61 GREATER TROCHANTERIC BURSITIS OF RIGHT HIP: Primary | ICD-10-CM

## 2024-01-25 NOTE — PROGRESS NOTES
Physical Therapy Treatment Note  [facility] [place]   [address] [place] [address]  P: [phone]       F: [phone]      Patient: Leatha Burkett   : 1980  Treatment Diagnosis:     ICD-10-CM ICD-9-CM   1. Greater trochanteric bursitis of right hip  M70.61 726.5     Referring practitioner: Javed Tejada MD  Date of Initial Visit: Type: THERAPY  Noted: 2023  Today's Date: 2024  Patient seen for 5 sessions           Subjective   Patient reports her hip is still sore, especially at night if she rolls onto that side.      Objective          Palpation     Right Tenderness of the TFL.     Tenderness     Right Hip   Tenderness in the greater trochanter.     Right Knee   Tenderness in the ITB.         See Exercise, Manual, and Modality Logs for complete treatment.       Assessment/Plan  Soft tissue was assessed at right hip/thigh. PT noted point tenderness as well as palpable trigger points within the muslce tissue. On this date patient stated that they would like to undergo a dry needling procedure for the soft tissue dysfunction. Patient was educated on the procedure for dry needling and consent waver signed/on file. Patient was informed of the risks, possible adverse effects, along with the benefits of DN. Patient responded positively to DN and estim with decreased TTP.    Progress per Plan of Care           Timed:         Manual Therapy:         mins  43390;     Therapeutic Exercise:         mins  66934;     Neuromuscular Sanjay:        mins  63272;    Therapeutic Activity:          mins  83709;     Gait Training:           mins  04983;     Ultrasound:          mins  13152;    Ionto                                  mins  00370  Self Care                            mins  72020  Canalith Repos         mins  04844  Orthotic MGMT/Train         mins  41498    Un-Timed:  Electrical Stimulation:    15     mins  07238 ( );  Dry Needling:          mins   20560 self-pay;  Dry Needling:     15     mins  20561 self-pay  Traction          mins  26141      Timed Treatment:      mins   Total Treatment:     30   mins        PT SIGNATURE: Mignon Arguello PT     License Number: [id number]  Electronically signed by Mignon Arguello PT, 01/25/24, 3:50 PM EST

## 2024-01-30 ENCOUNTER — TREATMENT (OUTPATIENT)
Age: 44
End: 2024-01-30
Payer: COMMERCIAL

## 2024-01-30 DIAGNOSIS — M70.61 GREATER TROCHANTERIC BURSITIS OF RIGHT HIP: Primary | ICD-10-CM

## 2024-01-31 NOTE — PROGRESS NOTES
Physical Therapy Treatment Note  [facility] [place]   [address] [place] [address]  P: [phone]       F: [phone]      Patient: Leatha Burkett   : 1980  Treatment Diagnosis:     ICD-10-CM ICD-9-CM   1. Greater trochanteric bursitis of right hip  M70.61 726.5     Referring practitioner: Javed Tejada MD  Date of Initial Visit: Type: THERAPY  Noted: 2023  Today's Date: 2024  Patient seen for 6 sessions           Subjective   Patient reports the dry needling helper her significantly.  States she has not had as much pain and was able to sleep better.     Objective     See Exercise, Manual, and Modality Logs for complete treatment.       Assessment/Plan  Soft tissue was assessed at right hip/thigh. PT noted point tenderness as well as palpable trigger points within the muslce tissue. On this date patient stated that they would like to undergo a dry needling procedure for the soft tissue dysfunction. Patient was educated on the procedure for dry needling and consent waver signed/on file. Patient was informed of the risks, possible adverse effects, along with the benefits of DN. Patient responded positively to DN and use of estim with improved muscle tone and decreased TTP.    Progress per Plan of Care           Timed:         Manual Therapy:         mins  99825;     Therapeutic Exercise:         mins  25241;     Neuromuscular Sanjay:        mins  87366;    Therapeutic Activity:          mins  45690;     Gait Training:           mins  41137;     Ultrasound:          mins  21660;    Ionto                                  mins  21941  Self Care                            mins  04643  Canalith Repos         mins  87680  Orthotic MGMT/Train         mins  73554    Un-Timed:  Electrical Stimulation:    15     mins  73177 ( );  Dry Needling:          mins  72440 self-pay;  Dry Needling:     15     mins   self-pay  Traction          mins   81280      Timed Treatment:      mins   Total Treatment:     30   mins        PT SIGNATURE: Mignon Arguello PT     License Number: [id number]  Electronically signed by Mignon Arguello PT, 01/30/24, 10:42 PM EST

## 2024-02-01 ENCOUNTER — TREATMENT (OUTPATIENT)
Age: 44
End: 2024-02-01
Payer: COMMERCIAL

## 2024-02-01 DIAGNOSIS — M70.61 GREATER TROCHANTERIC BURSITIS OF RIGHT HIP: Primary | ICD-10-CM

## 2024-02-01 PROCEDURE — 97530 THERAPEUTIC ACTIVITIES: CPT | Performed by: PHYSICAL THERAPIST

## 2024-02-01 NOTE — PROGRESS NOTES
PHYSICAL THERAPY DISCHARGE SUMMARY  [facility] [place]   [address] [place] [address]  P: [phone]       F: [phone]     Patient: Leatha Burkett   : 1980  Diagnosis/ICD-10 Code:  Greater trochanteric bursitis of right hip [M70.61]  Referring practitioner: Javed Tejada MD  Date of Initial Visit: Type: THERAPY  Noted: 2023  Today's Date: 2024  Patient seen for 7 sessions      Subjective:   Leatha Burkett reports:   Subjective Questionnaire: LEFS: 77/80  Clinical Progress: improved  Home Program Compliance: Yes  Treatment has included: therapeutic exercise, neuromuscular re-education, manual therapy, therapeutic activity, electrical stimulation, and dry needling    Subjective  Patient reports she feels 90% better.  States she had decreased pain from last treatment and has been able to sleep better.      Objective        Tenderness      Right Hip   No TTP      Lumbar Screen  Lumbar range of motion within normal limits.     Active Range of Motion   Left Hip   Normal active range of motion     Right Hip   Normal and pain free active range of motion      Strength/Myotome Testing      Right Hip   Planes of Motion   Flexion: 5  Extension: 5  Abduction: 5  External rotation: 5  Internal rotation: 5     Tests      Right Hip   Negative piriformis.   SLR: Negative.      Additional Tests Details  SLR: Left 80 degrees, Right 70 degrees     Symmetrical SIJ alignment     Functional Assessment   Squat   Symmetrical form.      Single Leg Stance   Left: 30 seconds  Right: 30 seconds       Assessment/Plan  Patient presents with pain free ROM in her right hip, improved strength and flexibility.  She has Symmetrical SIJ alignment and normal functional squat form.  SLS has improved to WNL as well.  She is independent in HEP.  She will continue with HEP and return for DN prn.  Progress toward previous goals: All Met    Goal Review:  Short Term Goals:  1.  Patient will  have pain free right hip ROM.-met  2.  Patient will have increased SLR to 70 degrees.-met  3.  Patient will have symmetrical SIJ alignment.-met  4.  Patient will have increased LE muscle flexibility to WNLs.-met     Long Term Goals:  1.  Patient will be independent in performance of HEP.-met  2.  Patient will have improved LEFS score of 40/80 or better.-met  3.  Patient will have increased right hip strength to 4+/5.-met  4.  Patient will have increased SLS time of 30 seconds.-met     Recommendations: Discharge to [facility], DN prn    PT SIGNATURE: Mignon Arguello PT     License Number: [id number]  Electronically signed by Mignon Arguello PT, 02/01/24, 3:35 PM EST    Timed:         Manual Therapy:         mins  51583;     Therapeutic Exercise:         mins  63637;     Neuromuscular Sanjay:        mins  23581;    Therapeutic Activity:     15     mins  18929;     Gait Training:           mins  89194;     Ultrasound:          mins  35956;    Ionto                                  mins  09238  Self Care                            mins  94157  Canalith Repos         mins  19507  Orthotic MGMT/Train         mins  74319    Un-Timed:  Electrical Stimulation:         mins  32011 ( );  Dry Needling:          mins  99640 self-pay;  Dry Needling:          mins  78931 self-pay  Traction          mins  93472  Low Eval          mins  75096  Mod Eval          mins  90208  High Eval                            mins  37320    Timed Treatment:   15   mins   Total Treatment:     15   mins

## 2024-02-19 ENCOUNTER — TELEPHONE (OUTPATIENT)
Facility: HOSPITAL | Age: 44
End: 2024-02-19
Payer: COMMERCIAL

## 2024-02-19 ENCOUNTER — TELEPHONE (OUTPATIENT)
Dept: OBSTETRICS AND GYNECOLOGY | Age: 44
End: 2024-02-19
Payer: COMMERCIAL

## 2024-02-19 RX ORDER — CEPHALEXIN 500 MG/1
500 CAPSULE ORAL 2 TIMES DAILY
Qty: 10 CAPSULE | Refills: 0 | Status: SHIPPED | OUTPATIENT
Start: 2024-02-19 | End: 2024-02-23 | Stop reason: SDUPTHER

## 2024-02-19 NOTE — TELEPHONE ENCOUNTER
Patient here for u/a culture c/o urgency with dysuria when voiding . Patient asking for couple of diflucan's if antibiotic prescribed today . Urine culture collected .

## 2024-02-19 NOTE — TELEPHONE ENCOUNTER
PT states she is having UTI symptoms and wants to know if she needs to come in or be sent a prescription

## 2024-02-20 RX ORDER — FLUCONAZOLE 150 MG/1
150 TABLET ORAL DAILY
Qty: 2 TABLET | Refills: 0 | Status: SHIPPED | OUTPATIENT
Start: 2024-02-20

## 2024-02-23 RX ORDER — CEPHALEXIN 500 MG/1
500 CAPSULE ORAL 2 TIMES DAILY
Qty: 4 CAPSULE | Refills: 0 | Status: SHIPPED | OUTPATIENT
Start: 2024-02-23 | End: 2024-02-25

## 2024-03-06 NOTE — TELEPHONE ENCOUNTER
LAST REFILL - 12/01/23  LAST VISIT - 08/02/23  NEXT VISIT - not scheduled    30 day supply pended, note to pharmacy for patient to follow up for further refills.

## 2024-04-03 ENCOUNTER — APPOINTMENT (OUTPATIENT)
Dept: WOMENS IMAGING | Facility: HOSPITAL | Age: 44
End: 2024-04-03
Payer: COMMERCIAL

## 2024-04-03 PROCEDURE — 77066 DX MAMMO INCL CAD BI: CPT | Performed by: RADIOLOGY

## 2024-04-03 PROCEDURE — 77062 BREAST TOMOSYNTHESIS BI: CPT | Performed by: RADIOLOGY

## 2024-04-03 PROCEDURE — 76642 ULTRASOUND BREAST LIMITED: CPT | Performed by: RADIOLOGY

## 2024-04-03 PROCEDURE — G0279 TOMOSYNTHESIS, MAMMO: HCPCS | Performed by: RADIOLOGY

## 2024-04-09 DIAGNOSIS — R92.8 CATEGORY 3 MAMMOGRAPHY RESULT WITH SHORT FOLLOW-UP INTERVAL SUGGESTED FOR PROBABLY BENIGN FINDING: Primary | ICD-10-CM

## 2024-04-09 DIAGNOSIS — N60.01 BREAST CYST, RIGHT: ICD-10-CM

## 2024-05-07 ENCOUNTER — TELEPHONE (OUTPATIENT)
Dept: GASTROENTEROLOGY | Facility: CLINIC | Age: 44
End: 2024-05-07
Payer: COMMERCIAL

## 2024-05-21 ENCOUNTER — DOCUMENTATION (OUTPATIENT)
Dept: ONCOLOGY | Facility: CLINIC | Age: 44
End: 2024-05-21
Payer: COMMERCIAL

## 2024-05-21 RX ORDER — CYANOCOBALAMIN, ISOPROPYL ALCOHOL 1000MCG/ML
1000 KIT INJECTION
Qty: 1 KIT | Refills: 4 | Status: SHIPPED | OUTPATIENT
Start: 2024-05-21

## 2024-05-21 NOTE — PROGRESS NOTES
Yes.  That is fine.  Please send in a prescription for her to do home B12 injections 1000 mcg IM monthly  ===View-only below this line===  ----- Message -----  From: Susannah Allison RN  Sent: 5/21/2024   9:24 AM EDT  To: Ramon Butts II, MD  Subject: FW: B12 Injection                                Dr. Butts,     Patient is wanting to switch from daily PO B12 to B12 injections at home. Are you okay with this being sent in?    Thank you  ----- Message -----  From: Kait Moralez RN  Sent: 5/21/2024   9:14 AM EDT  To: Susannah Allison RN  Subject: FW: B12 Injection                                  ----- Message -----  From: Leatha Burkett  Sent: 5/21/2024   9:11 AM EDT  To: [facility]  Subject: B12 Injection                                    Good morning,    I have been taking the B12 tabs over the counter and I don't think they are doing anything. I would like to go back to the injections that I can do at home.     Thank you,  Leatha Burkett

## 2024-05-24 ENCOUNTER — TELEPHONE (OUTPATIENT)
Dept: OBSTETRICS AND GYNECOLOGY | Age: 44
End: 2024-05-24
Payer: COMMERCIAL

## 2024-05-29 ENCOUNTER — TELEPHONE (OUTPATIENT)
Dept: GASTROENTEROLOGY | Facility: CLINIC | Age: 44
End: 2024-05-29
Payer: COMMERCIAL

## 2024-05-29 NOTE — TELEPHONE ENCOUNTER
NO RECORD OF C/S     NO PERSONAL HX OF POLYPS     FAMILY HX OF POLYPS     FAMILY HX OF COLON CA            LIST OF  MEDICATIONS    WEGOVY  METFORMIN  WELLBUTRIN            OA QUESTIONNAIRE SCANNED IN MEDIA

## 2024-05-30 ENCOUNTER — PREP FOR SURGERY (OUTPATIENT)
Dept: OTHER | Facility: HOSPITAL | Age: 44
End: 2024-05-30
Payer: COMMERCIAL

## 2024-05-30 DIAGNOSIS — Z80.0 FAMILY HISTORY OF GI MALIGNANCY: Primary | ICD-10-CM

## 2024-06-07 ENCOUNTER — OFFICE VISIT (OUTPATIENT)
Dept: OBSTETRICS AND GYNECOLOGY | Age: 44
End: 2024-06-07
Payer: COMMERCIAL

## 2024-06-07 VITALS
BODY MASS INDEX: 34.38 KG/M2 | HEIGHT: 63 IN | WEIGHT: 194 LBS | SYSTOLIC BLOOD PRESSURE: 124 MMHG | DIASTOLIC BLOOD PRESSURE: 72 MMHG

## 2024-06-07 DIAGNOSIS — N64.4 BREAST PAIN, RIGHT: Primary | ICD-10-CM

## 2024-06-07 DIAGNOSIS — N89.8 VAGINAL IRRITATION: ICD-10-CM

## 2024-06-07 DIAGNOSIS — R10.2 PELVIC PAIN: ICD-10-CM

## 2024-06-07 LAB
BILIRUB BLD-MCNC: ABNORMAL MG/DL
CLARITY, POC: CLEAR
COLOR UR: YELLOW
GLUCOSE UR STRIP-MCNC: NEGATIVE MG/DL
KETONES UR QL: ABNORMAL
LEUKOCYTE EST, POC: NEGATIVE
NITRITE UR-MCNC: NEGATIVE MG/ML
PH UR: 6 [PH] (ref 5–8)
PROT UR STRIP-MCNC: NEGATIVE MG/DL
RBC # UR STRIP: ABNORMAL /UL
SP GR UR: 1.02 (ref 1–1.03)
UROBILINOGEN UR QL: NORMAL

## 2024-06-07 RX ORDER — SEMAGLUTIDE 2.4 MG/.75ML
0.75 INJECTION, SOLUTION SUBCUTANEOUS
COMMUNITY
Start: 2024-06-03 | End: 2024-09-01

## 2024-06-07 NOTE — PROGRESS NOTES
"Subjective     Chief Complaint   Patient presents with    Gynecologic Exam     Right breast painful, vaginal odor, itching, lower abdominal pain       Leatha Burkett is a 43 y.o.  whose LMP is No LMP recorded. (Menstrual status: Oopherectomy).     Pt presents today with two separate complaints  She is noting right breast pain x 2 months. This is new. She states her breast is achy/heavy feeling. Feels it most of the day. No new lumps or masses. No nipple discharge or skin changes that she has noticed.    She is also noting vaginal itching and odor and with lower abdominal pain  She is s/p bilateral oophorectomy.     She had two UTI's a couple of months ago  She is SA with her      No Additional Complaints Reported    The following portions of the patient's history were reviewed and updated as appropriate:vital signs, allergies, current medications, past medical history, past social history, past surgical history, and problem list      Review of Systems   Pertinent items are noted in HPI.     Objective      /72   Ht 160 cm (63\")   Wt 88 kg (194 lb)   BMI 34.37 kg/m²     Physical Exam  Chest:          Comments: Dense tissue. Pain with palpation to noted area.        General:   alert, no distress, and mildly obese   Heart: Not performed today   Lungs: Not performed today.   Breast: normal appearance, no masses or tenderness, No nipple retraction or dimpling, No nipple discharge or bleeding, No axillary or supraclavicular adenopathy, Normal to palpation without dominant masses, positive findings: fibrocystic changes   Neck: na   Abdomen: {Not performed today   CVA: Not performed today   Pelvis: External genitalia: normal general appearance  Urinary system: urethral meatus normal  Vaginal: normal mucosa without prolapse or lesions, normal without tenderness, induration or masses, and normal rugae  Cervix: normal appearance   Extremities: Not performed today   Neurologic: negative   Psychiatric: Normal " affect, judgement, and mood       Lab Review   Labs: No data reviewed     Imaging   No data reviewed    Assessment & Plan     ASSESSMENT  1. Breast pain, right    2. Vaginal irritation    3. Pelvic pain        PLAN  1.   Orders Placed This Encounter   Procedures    NuSwab BV & Candida - Swab, Vagina    Mammo diagnostic digital tomosynthesis right w CAD    US breast right complete       2. Medications prescribed this encounter:      No orders of the defined types were placed in this encounter.      3. UA done. Vaginal swab to r/o BV and yeast. Will call with results. If all negative recommend trying vaginal estrogen for symptoms. Breast imaging ordered. If pelvic pain persists, recommend f/u with pelvic US.    Follow up: CHRISTOPHER Diaz, MESERET  6/7/2024

## 2024-06-09 LAB
BACTERIA UR CULT: NORMAL
BACTERIA UR CULT: NORMAL

## 2024-06-10 LAB
A VAGINAE DNA VAG QL NAA+PROBE: NORMAL SCORE
BVAB2 DNA VAG QL NAA+PROBE: NORMAL SCORE
C ALBICANS DNA VAG QL NAA+PROBE: NEGATIVE
C GLABRATA DNA VAG QL NAA+PROBE: NEGATIVE
MEGA1 DNA VAG QL NAA+PROBE: NORMAL SCORE

## 2024-06-10 RX ORDER — CYANOCOBALAMIN, ISOPROPYL ALCOHOL 1000MCG/ML
1000 KIT INJECTION
Qty: 1 KIT | Refills: 4 | Status: SHIPPED | OUTPATIENT
Start: 2024-06-10

## 2024-06-12 ENCOUNTER — APPOINTMENT (OUTPATIENT)
Dept: WOMENS IMAGING | Facility: HOSPITAL | Age: 44
End: 2024-06-12
Payer: COMMERCIAL

## 2024-06-12 PROCEDURE — 77065 DX MAMMO INCL CAD UNI: CPT | Performed by: RADIOLOGY

## 2024-06-12 PROCEDURE — 76642 ULTRASOUND BREAST LIMITED: CPT | Performed by: RADIOLOGY

## 2024-06-12 PROCEDURE — 77061 BREAST TOMOSYNTHESIS UNI: CPT | Performed by: RADIOLOGY

## 2024-06-12 PROCEDURE — G0279 TOMOSYNTHESIS, MAMMO: HCPCS | Performed by: RADIOLOGY

## 2024-06-12 RX ORDER — ESTRADIOL 0.1 MG/G
CREAM VAGINAL
Qty: 42.5 G | Refills: 3 | Status: SHIPPED | OUTPATIENT
Start: 2024-06-12

## 2024-07-03 NOTE — TELEPHONE ENCOUNTER
Caller: Madelaine Leatha    Relationship: Self    Best call back number:     [phone]       Requested Prescriptions:   Requested Prescriptions     Pending Prescriptions Disp Refills    metFORMIN (GLUCOPHAGE) 1000 MG tablet 90 tablet 0     Sig: Take 1 tablet by mouth Daily With Breakfast.        Pharmacy where request should be sent: [facility] - [address] [phone] [facility] [phone]      Last office visit with prescribing clinician: 8/2/2023   Last telemedicine visit with prescribing clinician: Visit date not found   Next office visit with prescribing clinician: Visit date not found     Additional details provided by patient: PATIENT IS OUT OF MEDICATION    Does the patient have less than a 3 day supply:  [x] Yes  [] No    Would you like a call back once the refill request has been completed: [] Yes [x] No    If the office needs to give you a call back, can they leave a voicemail: [] Yes [x] No    Harrison Santiago   07/03/24 08:14 EDT

## 2024-07-17 ENCOUNTER — OUTSIDE FACILITY SERVICE (OUTPATIENT)
Dept: GASTROENTEROLOGY | Facility: CLINIC | Age: 44
End: 2024-07-17
Payer: COMMERCIAL

## 2024-07-17 PROCEDURE — 45378 DIAGNOSTIC COLONOSCOPY: CPT | Performed by: INTERNAL MEDICINE

## 2024-07-18 ENCOUNTER — TELEPHONE (OUTPATIENT)
Dept: GASTROENTEROLOGY | Facility: CLINIC | Age: 44
End: 2024-07-18
Payer: COMMERCIAL

## 2024-07-18 NOTE — TELEPHONE ENCOUNTER
Pt reviewed results via OF[facility]ALD[facility].     Sent pt OFERTALDIA msg advising of results and recommendations. Advised to call if any questions.     Colonoscopy placed in  and recall for 7/17/29.

## 2024-07-18 NOTE — TELEPHONE ENCOUNTER
----- Message from David Middleton sent at 7/18/2024 11:31 AM EDT -----  Colonoscopy recall 5 years

## 2024-07-29 ENCOUNTER — LAB (OUTPATIENT)
Dept: LAB | Facility: HOSPITAL | Age: 44
End: 2024-07-29
Payer: COMMERCIAL

## 2024-07-29 DIAGNOSIS — D64.9 ANEMIA, UNSPECIFIED TYPE: ICD-10-CM

## 2024-07-29 LAB
BASOPHILS # BLD AUTO: 0.04 10*3/MM3 (ref 0–0.2)
BASOPHILS NFR BLD AUTO: 0.7 % (ref 0–1.5)
DEPRECATED RDW RBC AUTO: 42.1 FL (ref 37–54)
EOSINOPHIL # BLD AUTO: 0.05 10*3/MM3 (ref 0–0.4)
EOSINOPHIL NFR BLD AUTO: 0.8 % (ref 0.3–6.2)
ERYTHROCYTE [DISTWIDTH] IN BLOOD BY AUTOMATED COUNT: 12.3 % (ref 12.3–15.4)
FERRITIN SERPL-MCNC: 73.6 NG/ML (ref 13–150)
HCT VFR BLD AUTO: 43.4 % (ref 34–46.6)
HGB BLD-MCNC: 13.5 G/DL (ref 12–15.9)
HGB RETIC QN AUTO: 33.1 PG (ref 29.8–36.1)
IMM GRANULOCYTES # BLD AUTO: 0.02 10*3/MM3 (ref 0–0.05)
IMM GRANULOCYTES NFR BLD AUTO: 0.3 % (ref 0–0.5)
IMM RETICS NFR: 7.2 % (ref 3–15.8)
IRON 24H UR-MRATE: 70 MCG/DL (ref 37–145)
IRON SATN MFR SERPL: 20 % (ref 20–50)
LYMPHOCYTES # BLD AUTO: 1.92 10*3/MM3 (ref 0.7–3.1)
LYMPHOCYTES NFR BLD AUTO: 31.3 % (ref 19.6–45.3)
MCH RBC QN AUTO: 28.8 PG (ref 26.6–33)
MCHC RBC AUTO-ENTMCNC: 31.1 G/DL (ref 31.5–35.7)
MCV RBC AUTO: 92.5 FL (ref 79–97)
MONOCYTES # BLD AUTO: 0.57 10*3/MM3 (ref 0.1–0.9)
MONOCYTES NFR BLD AUTO: 9.3 % (ref 5–12)
NEUTROPHILS NFR BLD AUTO: 3.53 10*3/MM3 (ref 1.7–7)
NEUTROPHILS NFR BLD AUTO: 57.6 % (ref 42.7–76)
NRBC BLD AUTO-RTO: 0 /100 WBC (ref 0–0.2)
PLATELET # BLD AUTO: 271 10*3/MM3 (ref 140–450)
PMV BLD AUTO: 9.9 FL (ref 6–12)
RBC # BLD AUTO: 4.69 10*6/MM3 (ref 3.77–5.28)
RETICS # AUTO: 0.1 10*6/MM3 (ref 0.02–0.13)
RETICS/RBC NFR AUTO: 2.1 % (ref 0.7–1.9)
TIBC SERPL-MCNC: 352 MCG/DL (ref 298–536)
TRANSFERRIN SERPL-MCNC: 236 MG/DL (ref 200–360)
VIT B12 BLD-MCNC: 342 PG/ML (ref 211–946)
WBC NRBC COR # BLD AUTO: 6.13 10*3/MM3 (ref 3.4–10.8)

## 2024-07-29 PROCEDURE — 82728 ASSAY OF FERRITIN: CPT

## 2024-07-29 PROCEDURE — 85025 COMPLETE CBC W/AUTO DIFF WBC: CPT

## 2024-07-29 PROCEDURE — 36415 COLL VENOUS BLD VENIPUNCTURE: CPT

## 2024-07-29 PROCEDURE — 84466 ASSAY OF TRANSFERRIN: CPT

## 2024-07-29 PROCEDURE — 83540 ASSAY OF IRON: CPT

## 2024-07-29 PROCEDURE — 85046 RETICYTE/HGB CONCENTRATE: CPT

## 2024-07-29 PROCEDURE — 82607 VITAMIN B-12: CPT | Performed by: INTERNAL MEDICINE

## 2024-07-30 LAB
FOLATE BLD-MCNC: 391 NG/ML
FOLATE RBC-MCNC: 929 NG/ML
HCT VFR BLD AUTO: 42.1 % (ref 34–46.6)

## 2024-08-05 ENCOUNTER — OFFICE VISIT (OUTPATIENT)
Dept: ONCOLOGY | Facility: CLINIC | Age: 44
End: 2024-08-05
Payer: COMMERCIAL

## 2024-08-05 ENCOUNTER — TELEPHONE (OUTPATIENT)
Dept: ONCOLOGY | Facility: CLINIC | Age: 44
End: 2024-08-05
Payer: COMMERCIAL

## 2024-08-05 VITALS
WEIGHT: 195.1 LBS | SYSTOLIC BLOOD PRESSURE: 114 MMHG | OXYGEN SATURATION: 97 % | HEART RATE: 97 BPM | BODY MASS INDEX: 34.56 KG/M2 | TEMPERATURE: 98.5 F | DIASTOLIC BLOOD PRESSURE: 83 MMHG

## 2024-08-05 DIAGNOSIS — D64.9 ANEMIA, UNSPECIFIED TYPE: Primary | ICD-10-CM

## 2024-08-05 PROCEDURE — 99214 OFFICE O/P EST MOD 30 MIN: CPT | Performed by: INTERNAL MEDICINE

## 2024-08-05 RX ORDER — CYANOCOBALAMIN, ISOPROPYL ALCOHOL 1000MCG/ML
1000 KIT INJECTION
Qty: 1 KIT | Refills: 4 | Status: SHIPPED | OUTPATIENT
Start: 2024-08-05

## 2024-08-05 RX ORDER — NYSTATIN 100000 U/G
1 CREAM TOPICAL
COMMUNITY
Start: 2024-07-22

## 2024-08-05 NOTE — TELEPHONE ENCOUNTER
----- Message from Ramon Butts sent at 8/5/2024  9:54 AM EDT -----  Hi Susannah,    Please change her B12 to be 1000 mcg IM every 2 weeks (she self injects).  Please send in a new prescription she was on every 4 weeks.  But we are changing her to every 2 weeks      Thanks!

## 2024-08-05 NOTE — PROGRESS NOTES
.     REASON FOR FOLLOWUP :   Iron deficiency anemia    HISTORY OF PRESENT ILLNESS:  The patient is a 43 y.o. year old female  who is here for follow-up with the above-mentioned history.    No new issues.  At last visit we changed her to oral B12 to try to improve compliance.  In May she requested changing back to injections because she has more energy on injections.  We did this.  She states her bariatric surgeon recommended injections every 2 weeks instead of every 4 weeks with a goal of keeping B12 above 400.  This is fine and we will do this.  No other new issues no bleeding    Past Medical History:   Diagnosis Date    Anemia 2016    Anxiety     Bariatric surgery status     Depression     Endometriosis 11/12/2021    Essential hypertension, benign     Fibroid 11/12/2021    GERD (gastroesophageal reflux disease)     Hernia 2012    Migraine     Multiple gestation     Obesity     МАРИНА (obstructive sleep apnea)     PCOS (polycystic ovarian syndrome)     PMS (premenstrual syndrome)     Polycystic ovary syndrome 1999    Ulcer 2022     Past Surgical History:   Procedure Laterality Date    ABDOMINAL SURGERY  2002    BARIATRIC SURGERY  2003    BREAST BIOPSY  2013    COLONOSCOPY  2022    D & C WITH SUCTION  1999    DILATATION AND CURETTAGE  1999    ENDOSCOPY  2022    GASTRIC BYPASS  2003    GASTRIC SLEEVE LAPAROSCOPIC  2003    HERNIA REPAIR  2012    HYSTEROSCOPY      OOPHORECTOMY Right 2004    OOPHORECTOMY  2021    OVARIAN CYST SURGERY      OVARY SURGERY Bilateral     right removed 2006  left removed 2021    UPPER GASTROINTESTINAL ENDOSCOPY      WISDOM TOOTH EXTRACTION         MEDICATIONS    Current Outpatient Medications:     buPROPion SR (WELLBUTRIN SR) 150 MG 12 hr tablet, Take 1 tablet by mouth Every 12 (Twelve) Hours., Disp: , Rfl:     Cyanocobalamin (B-12 Compliance Injection) 1000 MCG/ML kit, Inject 1 mL as directed Every 30 (Thirty) Days., Disp: 1 kit, Rfl: 4    estradiol (ESTRACE) 0.1 MG/GM vaginal cream, 1 gram  "inserted into vagina two times per week, Disp: 42.5 g, Rfl: 3    metFORMIN (GLUCOPHAGE) 1000 MG tablet, Take 1 tablet by mouth Daily With Breakfast., Disp: 90 tablet, Rfl: 0    multivitamin (THERAGRAN) tablet tablet, Take 1 tablet by mouth., Disp: , Rfl:     nystatin (MYCOSTATIN) 752692 UNIT/GM cream, Apply 1 Application topically to the appropriate area as directed., Disp: , Rfl:     Syringe 25G X 5/8\" 3 ML misc, USE ONE SYRINGE WITH EACH B12 INJECTION MONTHLY, Disp: 3 each, Rfl: 1    Wegovy 2.4 MG/0.75ML solution auto-injector, Inject 0.75 mL under the skin into the appropriate area as directed Every 7 (Seven) Days., Disp: , Rfl:     fluconazole (Diflucan) 150 MG tablet, Take 1 tablet by mouth Daily. Take with antibiotic on day 1 and day 5, Disp: 2 tablet, Rfl: 0    ALLERGIES:   No Known Allergies    SOCIAL HISTORY:       Social History     Socioeconomic History    Marital status:      Spouse name: Galilea    Number of children: 2   Tobacco Use    Smoking status: Never    Smokeless tobacco: Never   Vaping Use    Vaping status: Never Used   Substance and Sexual Activity    Alcohol use: Not Currently     Comment: socially    Drug use: Never    Sexual activity: Yes     Partners: Female     Birth control/protection: Same-sex partner         FAMILY HISTORY:  Family History   Problem Relation Age of Onset    Hypertension Father     Cancer Father         Pancreatic    Diabetes Father     Heart disease Father     Pancreatic cancer Father     Sleep apnea Father     Heart disease Mother     Hypertension Mother     Hyperlipidemia Mother     Cancer Brother         Lung/smoker    Early death Brother     Lung cancer Brother     Diabetes Sister     Heart disease Sister         Heart attack 50s    Heart attack Sister     Heart attack Paternal Grandfather     Stroke Paternal Grandmother     Colon cancer Maternal Grandmother     Cancer Maternal Grandmother         Colon (did not cause death)    Colon cancer Maternal " Grandfather     Cancer Maternal Grandfather         Colon    Colon cancer Maternal Uncle     Ovarian cancer Other     Breast cancer Neg Hx     Uterine cancer Neg Hx        REVIEW OF SYSTEMS:  Review of Systems   Constitutional:  Negative for activity change.   HENT:  Negative for nosebleeds and trouble swallowing.    Respiratory:  Negative for shortness of breath and wheezing.    Cardiovascular:  Negative for chest pain and palpitations.   Gastrointestinal:  Negative for constipation, diarrhea and nausea.   Genitourinary:  Negative for dysuria and hematuria.   Musculoskeletal:  Negative for arthralgias and myalgias.   Skin:  Negative for rash and wound.   Neurological:  Negative for seizures and syncope.   Hematological:  Negative for adenopathy. Does not bruise/bleed easily.   Psychiatric/Behavioral:  Negative for confusion.               Vitals:    08/05/24 0926   BP: 114/83   Pulse: 97   Temp: 98.5 °F (36.9 °C)   TempSrc: Oral   SpO2: 97%   Weight: 88.5 kg (195 lb 1.6 oz)   PainSc: 0-No pain         8/5/2024     9:26 AM   Current Status   ECOG score 0      PHYSICAL EXAM:        CONSTITUTIONAL:  Vital signs reviewed.  No distress, looks comfortable.  EYES:  Conjunctiva and lids unremarkable.  PERRLA  EARS,NOSE,MOUTH,THROAT:  Ears and nose appear unremarkable.  Lips, teeth, gums appear unremarkable.  RESPIRATORY:  Normal respiratory effort.  Lungs clear to auscultation bilaterally.  CARDIOVASCULAR:  Normal S1, S2.  No murmurs rubs or gallops.  No significant lower extremity edema.  GASTROINTESTINAL: Abdomen appears unremarkable.  Nontender.  No hepatomegaly.  No splenomegaly.  LYMPHATIC:  No cervical, supraclavicular, axillary lymphadenopathy.  SKIN:  Warm.  No rashes.  PSYCHIATRIC:  Normal judgment and insight.  Normal mood and affect.          RECENT LABS:        WBC   Date Value Ref Range Status   07/29/2024 6.13 3.40 - 10.80 10*3/mm3 Final   12/18/2023 7.10 3.40 - 10.80 10*3/mm3 Final   05/03/2023 6.81 3.40 -  10.80 10*3/mm3 Final   11/02/2022 7.02 3.40 - 10.80 10*3/mm3 Final   09/19/2022 5.92 3.40 - 10.80 10*3/mm3 Final     Hemoglobin   Date Value Ref Range Status   07/29/2024 13.5 12.0 - 15.9 g/dL Final   12/18/2023 13.9 12.0 - 15.9 g/dL Final   05/03/2023 13.6 12.0 - 15.9 g/dL Final   11/02/2022 14.8 12.0 - 15.9 g/dL Final   09/19/2022 14.4 12.0 - 15.9 g/dL Final     Platelets   Date Value Ref Range Status   07/29/2024 271 140 - 450 10*3/mm3 Final   12/18/2023 276 140 - 450 10*3/mm3 Final   05/03/2023 301 140 - 450 10*3/mm3 Final   11/02/2022 248 140 - 450 10*3/mm3 Final   09/19/2022 264 140 - 450 10*3/mm3 Final       Assessment & Plan   There are no diagnoses linked to this encounter.      Leatha Burkett   *Iron deficiency anemia  History of IV iron, which typically improves body cramps/aches/fatigue  11/2/2022: Transferred care to our practice after moving to [place] from [place].  Previously under the care of Dr. Marquise Huerta, hematology oncology at [facility] in [place], [place].  He reported she was needing IV iron on average annually  Cannot tolerate oral iron due to constipation and abdominal pain.  Also oral iron is not effective due to poor absorption (has not been effective in the past and she has gastric bypass, which is felt to decrease absorption)  9/19/2022: Ferritin 241, serum iron 104 (normal iron saturation).  Hb 14.4.  MCV 91.6.  No need for IV iron at this time.  5/3/2023: Ferritin 162, 32% saturation, Hb 13.6.  No need for IV iron at this time  12/18/2023: Ferritin 140, 25% saturation, Hb 13.9.  No need for IV iron.  7/29/2024: Ferritin 74, 20% saturation, Hb 13.5.  No need for IV iron    *Source of iron deficiency  Gastric bypass 2003, [facility]  History of menorrhagia.  First ovary removed 2006.  Second ovary removed November 2021.  (Ovaries were removed due to cysts.)    No vaginal bleeding since then.  Patient's last EGD and colonoscopy April 2021.  She states she was told to have  the next 5 years later.  5/10/2023: Patient states ongoing GERD despite omeprazole and Carafate.  She states last EGD showed ulcers.  She would like to see a GI in [place].  Last EGD was through [facility].    *B12 deficiency, since gastric bypass surgery.  Monthly IM B12 injections at home  B12 819 on 9/19/2022.  B12 399 on 5/3/2023.  She reported she forgot to do the B12 monthly self injections and has not been on these for the past 6 months.  Encouraged to resume  12/18/2023: B12 178 (low).  She forgets to take the self IM injections.  Therefore, changed to B12 1000 mcg oral daily (1000 mcg IM injection x 1 in the office today).  5/21/24: Per patient request: Switched back to self IM B12 injections monthly  7/29/2024: B12 342.  She states her bariatric surgeon request keeping B12 >400.  Therefore, changed to  self IM B12 to every 2 weeks    *Gastric bypass surgery, 2003 (lost 150 pounds).  RBC folate normal, 1170, on 5/3/2023    *Family history of colon cancer  Mom age 48, maternal grandmother age 45, maternal grandfather age 68.  Patient's last EGD and colonoscopy April 2021.  She states she was told to have the next 5 years later.  She declines genetic counseling but knows it is available    *Class 1 obesity.  Being overweight can lead to cytopenias through hepatic steatosis.  Body mass index is 34.56 kg/m².  BMI 25 to <30 is overweight  BMI 30 to <35 is class 1 obesity  BMI 35 to <40 is class 2 obesity  BMI 40 or higher is class 3 obesity   Remaining overweight.  Ideally, lose weight    Plan  MD 6 months.  1 week prior: Ferritin, iron panel, CBC, reticulate hemoglobin, B12 level, RBC folate  (had also been getting folate checked).  Vitamin B12 1000 mcg IM every 2 weeks, self injects

## 2024-09-11 RX ORDER — CYANOCOBALAMIN 1000 UG/ML
INJECTION, SOLUTION INTRAMUSCULAR; SUBCUTANEOUS
Qty: 3 ML | Refills: 3 | OUTPATIENT
Start: 2024-09-11

## 2024-09-13 ENCOUNTER — PATIENT MESSAGE (OUTPATIENT)
Dept: ONCOLOGY | Facility: CLINIC | Age: 44
End: 2024-09-13
Payer: COMMERCIAL

## 2024-09-13 RX ORDER — CYANOCOBALAMIN, ISOPROPYL ALCOHOL 1000MCG/ML
1000 KIT INJECTION
Qty: 1 KIT | Refills: 4 | Status: SHIPPED | OUTPATIENT
Start: 2024-09-13

## 2024-09-19 ENCOUNTER — OFFICE VISIT (OUTPATIENT)
Dept: FAMILY MEDICINE CLINIC | Facility: CLINIC | Age: 44
End: 2024-09-19
Payer: COMMERCIAL

## 2024-09-19 VITALS
BODY MASS INDEX: 34.59 KG/M2 | WEIGHT: 195.2 LBS | HEIGHT: 63 IN | HEART RATE: 98 BPM | DIASTOLIC BLOOD PRESSURE: 76 MMHG | RESPIRATION RATE: 18 BRPM | OXYGEN SATURATION: 97 % | SYSTOLIC BLOOD PRESSURE: 124 MMHG

## 2024-09-19 DIAGNOSIS — R29.898 WEAKNESS OF RIGHT ARM: ICD-10-CM

## 2024-09-19 DIAGNOSIS — F41.9 ANXIETY AND DEPRESSION: ICD-10-CM

## 2024-09-19 DIAGNOSIS — F32.A ANXIETY AND DEPRESSION: ICD-10-CM

## 2024-09-19 DIAGNOSIS — Z23 NEED FOR VACCINATION: ICD-10-CM

## 2024-09-19 DIAGNOSIS — Z00.00 ANNUAL PHYSICAL EXAM: Primary | ICD-10-CM

## 2024-09-19 DIAGNOSIS — B37.2 CANDIDIASIS OF SKIN: ICD-10-CM

## 2024-09-19 DIAGNOSIS — S80.11XS LEG HEMATOMA, RIGHT, SEQUELA: ICD-10-CM

## 2024-09-19 DIAGNOSIS — Z91.81 HISTORY OF FALL: ICD-10-CM

## 2024-09-19 DIAGNOSIS — Z98.84 HISTORY OF BARIATRIC SURGERY: ICD-10-CM

## 2024-09-19 PROCEDURE — 90471 IMMUNIZATION ADMIN: CPT | Performed by: NURSE PRACTITIONER

## 2024-09-19 PROCEDURE — 91320 SARSCV2 VAC 30MCG TRS-SUC IM: CPT | Performed by: NURSE PRACTITIONER

## 2024-09-19 PROCEDURE — 90656 IIV3 VACC NO PRSV 0.5 ML IM: CPT | Performed by: NURSE PRACTITIONER

## 2024-09-19 PROCEDURE — 99214 OFFICE O/P EST MOD 30 MIN: CPT | Performed by: NURSE PRACTITIONER

## 2024-09-19 PROCEDURE — 99396 PREV VISIT EST AGE 40-64: CPT | Performed by: NURSE PRACTITIONER

## 2024-09-19 PROCEDURE — 90480 ADMN SARSCOV2 VAC 1/ONLY CMP: CPT | Performed by: NURSE PRACTITIONER

## 2024-09-19 RX ORDER — NYSTATIN 100000 [USP'U]/G
POWDER TOPICAL 3 TIMES DAILY
Qty: 60 G | Refills: 3 | Status: SHIPPED | OUTPATIENT
Start: 2024-09-19 | End: 2024-10-19

## 2024-09-19 RX ORDER — SEMAGLUTIDE 2.4 MG/.75ML
2.4 INJECTION, SOLUTION SUBCUTANEOUS
COMMUNITY
Start: 2024-07-30

## 2024-11-20 DIAGNOSIS — E66.812 OBESITY, CLASS II, BMI 35-39.9: Primary | ICD-10-CM

## 2025-01-06 NOTE — PROGRESS NOTES
"Subjective     Chief Complaint   Patient presents with    Gynecologic Exam     AC, last pap 24 normal.        History of Present Illness    Leatha Burkett is a 44 y.o.  who presents for annual exam.  She denies pelvic pain or vag bleeding  She notes her breast pain has resolved    Obstetric History:  OB History          3    Para   2    Term   1       1    AB   1    Living   2         SAB   1    IAB        Ectopic        Molar        Multiple        Live Births   2          Obstetric Comments   Hx incompetent cervix, had cerclage with last 2 pregnancies              Menstrual History:     No LMP recorded. (Menstrual status: Oopherectomy).         Current contraception: s/p removal of both ovaries/ female partner  History of abnormal Pap smear: no  Received Gardasil immunization: no  Perform regular self breast exam:  yes  Family history of uterine or ovarian cancer: yes - pat great aunt -ovary  Family History of colon cancer: yes, MGF, MGM  Family history of breast cancer: no    Mammogram: up to date, probably benign. Diagnostic imaging due 2025 and ordered  Colonoscopy: up to date, done 2024, repeat 5 yrs per Dr. Middleton  DEXA: ordered.    Exercise: moderately active  Calcium/Vitamin D: adequate intake    The following portions of the patient's history were reviewed and updated as appropriate: allergies, current medications, past family history, past medical history, past social history, past surgical history, and problem list.    Review of Systems    Review of Systems   Constitutional: Negative for fatigue.   Respiratory: Negative for shortness of breath.    Gastrointestinal: Negative for abdominal pain.   Genitourinary: Negative for vag bleeding or pelvic pain  Neurological: Negative for severe headaches.   Psychiatric/Behavioral: Negative for dysphoric mood.         Objective   Physical Exam    /78   Ht 160 cm (63\")   Wt 87.5 kg (193 lb)   BMI 34.19 kg/m²   General:   Alert, " in no distress   Heart: regular rate and rhythm   Lungs: clear to auscultation bilaterally   Breast: Inspection is negative. Left breast is without masses, retractions, nipple discharge or axillary adenopathy. Right breast is without masses, retractions, nipple discharge or axillary adenopathy.     Neck: Supple, no thyromegaly   Abdomen: Soft, no tenderness or guarding   Pelvis: External genitalia: normal general appearance  Urinary system: urethral meatus normal  Vaginal: normal mucosa without prolapse or lesions  Cervix: normal appearance  Adnexa: no masses or tenderness  Uterus: normal, nontender   Extremities: Normal without edema   Neurologic: Alert and oriented   Psychiatric: Normal affect, judgment and mood     Assessment & Plan   Diagnoses and all orders for this visit:    1. Encounter for gynecological examination (Primary)    2. Category 3 mammography result with short follow-up interval suggested for probably benign finding        All questions answered.  Breast self exam technique reviewed and patient encouraged to perform self-exam monthly.  Discussed healthy lifestyle modifications.  Recommended 30 minutes of aerobic exercise five times per week.  Discussed calcium/ vit d needs to prevent osteoporosis.  Pap def as up to date, mammogram and u/s booked 4/9/25. Recommend pt schedule appt following to review results. Can also check bone density at that time. She agrees with this plan.

## 2025-01-07 ENCOUNTER — OFFICE VISIT (OUTPATIENT)
Dept: OBSTETRICS AND GYNECOLOGY | Age: 45
End: 2025-01-07
Payer: COMMERCIAL

## 2025-01-07 VITALS
BODY MASS INDEX: 34.2 KG/M2 | SYSTOLIC BLOOD PRESSURE: 120 MMHG | WEIGHT: 193 LBS | HEIGHT: 63 IN | DIASTOLIC BLOOD PRESSURE: 78 MMHG

## 2025-01-07 DIAGNOSIS — Z01.419 ENCOUNTER FOR GYNECOLOGICAL EXAMINATION: Primary | ICD-10-CM

## 2025-01-07 DIAGNOSIS — E89.40 SURGICAL MENOPAUSE: ICD-10-CM

## 2025-01-07 DIAGNOSIS — R92.8 CATEGORY 3 MAMMOGRAPHY RESULT WITH SHORT FOLLOW-UP INTERVAL SUGGESTED FOR PROBABLY BENIGN FINDING: ICD-10-CM

## 2025-01-07 PROCEDURE — 99396 PREV VISIT EST AGE 40-64: CPT | Performed by: OBSTETRICS & GYNECOLOGY

## 2025-01-07 RX ORDER — TIRZEPATIDE 10 MG/.5ML
10 INJECTION, SOLUTION SUBCUTANEOUS
COMMUNITY
Start: 2024-12-18

## 2025-01-20 ENCOUNTER — LAB (OUTPATIENT)
Dept: LAB | Facility: HOSPITAL | Age: 45
End: 2025-01-20
Payer: COMMERCIAL

## 2025-01-20 DIAGNOSIS — D64.9 ANEMIA, UNSPECIFIED TYPE: ICD-10-CM

## 2025-01-20 LAB
BASOPHILS # BLD AUTO: 0.03 10*3/MM3 (ref 0–0.2)
BASOPHILS NFR BLD AUTO: 0.5 % (ref 0–1.5)
DEPRECATED RDW RBC AUTO: 42 FL (ref 37–54)
EOSINOPHIL # BLD AUTO: 0.05 10*3/MM3 (ref 0–0.4)
EOSINOPHIL NFR BLD AUTO: 0.8 % (ref 0.3–6.2)
ERYTHROCYTE [DISTWIDTH] IN BLOOD BY AUTOMATED COUNT: 12.1 % (ref 12.3–15.4)
FERRITIN SERPL-MCNC: 77 NG/ML (ref 13–150)
HCT VFR BLD AUTO: 40.4 % (ref 34–46.6)
HGB BLD-MCNC: 12.8 G/DL (ref 12–15.9)
HGB RETIC QN AUTO: 33.1 PG (ref 29.8–36.1)
IMM GRANULOCYTES # BLD AUTO: 0.02 10*3/MM3 (ref 0–0.05)
IMM GRANULOCYTES NFR BLD AUTO: 0.3 % (ref 0–0.5)
IMM RETICS NFR: 8 % (ref 3–15.8)
IRON 24H UR-MRATE: 78 MCG/DL (ref 37–145)
IRON SATN MFR SERPL: 21 % (ref 20–50)
LYMPHOCYTES # BLD AUTO: 1.8 10*3/MM3 (ref 0.7–3.1)
LYMPHOCYTES NFR BLD AUTO: 29.1 % (ref 19.6–45.3)
MCH RBC QN AUTO: 29.6 PG (ref 26.6–33)
MCHC RBC AUTO-ENTMCNC: 31.7 G/DL (ref 31.5–35.7)
MCV RBC AUTO: 93.3 FL (ref 79–97)
MONOCYTES # BLD AUTO: 0.59 10*3/MM3 (ref 0.1–0.9)
MONOCYTES NFR BLD AUTO: 9.5 % (ref 5–12)
NEUTROPHILS NFR BLD AUTO: 3.7 10*3/MM3 (ref 1.7–7)
NEUTROPHILS NFR BLD AUTO: 59.8 % (ref 42.7–76)
NRBC BLD AUTO-RTO: 0 /100 WBC (ref 0–0.2)
PLATELET # BLD AUTO: 273 10*3/MM3 (ref 140–450)
PMV BLD AUTO: 9.8 FL (ref 6–12)
RBC # BLD AUTO: 4.33 10*6/MM3 (ref 3.77–5.28)
RETICS # AUTO: 0.12 10*6/MM3 (ref 0.02–0.13)
RETICS/RBC NFR AUTO: 2.88 % (ref 0.7–1.9)
TIBC SERPL-MCNC: 374 MCG/DL (ref 298–536)
TRANSFERRIN SERPL-MCNC: 251 MG/DL (ref 200–360)
VIT B12 BLD-MCNC: 416 PG/ML (ref 211–946)
WBC NRBC COR # BLD AUTO: 6.19 10*3/MM3 (ref 3.4–10.8)

## 2025-01-20 PROCEDURE — 83540 ASSAY OF IRON: CPT

## 2025-01-20 PROCEDURE — 82607 VITAMIN B-12: CPT | Performed by: INTERNAL MEDICINE

## 2025-01-20 PROCEDURE — 85046 RETICYTE/HGB CONCENTRATE: CPT

## 2025-01-20 PROCEDURE — 36415 COLL VENOUS BLD VENIPUNCTURE: CPT

## 2025-01-20 PROCEDURE — 84466 ASSAY OF TRANSFERRIN: CPT

## 2025-01-20 PROCEDURE — 85025 COMPLETE CBC W/AUTO DIFF WBC: CPT

## 2025-01-20 PROCEDURE — 82728 ASSAY OF FERRITIN: CPT

## 2025-01-21 LAB
FOLATE BLD-MCNC: 375 NG/ML
FOLATE RBC-MCNC: 966 NG/ML
HCT VFR BLD AUTO: 38.8 % (ref 34–46.6)

## 2025-01-27 ENCOUNTER — OFFICE VISIT (OUTPATIENT)
Dept: ONCOLOGY | Facility: CLINIC | Age: 45
End: 2025-01-27
Payer: COMMERCIAL

## 2025-01-27 VITALS
DIASTOLIC BLOOD PRESSURE: 80 MMHG | BODY MASS INDEX: 33.91 KG/M2 | HEART RATE: 84 BPM | SYSTOLIC BLOOD PRESSURE: 114 MMHG | TEMPERATURE: 97.3 F | WEIGHT: 191.4 LBS | HEIGHT: 63 IN

## 2025-01-27 DIAGNOSIS — D64.9 ANEMIA, UNSPECIFIED TYPE: Primary | ICD-10-CM

## 2025-01-27 PROCEDURE — 99214 OFFICE O/P EST MOD 30 MIN: CPT | Performed by: INTERNAL MEDICINE

## 2025-01-27 NOTE — PROGRESS NOTES
"  .     REASON FOR FOLLOWUP :   Iron deficiency anemia    HISTORY OF PRESENT ILLNESS:  The patient is a 44 y.o. year old female  who is here for follow-up with the above-mentioned history.    No new problems.  No bleeding.  No chest pain or SOA    Past Medical History:   Diagnosis Date    Anemia 2016    Anxiety     Bariatric surgery status     Depression     Endometriosis 11/12/2021    Essential hypertension, benign     Fibroid 11/12/2021    GERD (gastroesophageal reflux disease)     Hernia 2012    Migraine     Multiple gestation     Obesity     МАРИНА (obstructive sleep apnea)     PCOS (polycystic ovarian syndrome)     PMS (premenstrual syndrome)     Polycystic ovary syndrome 1999    Ulcer 2022     Past Surgical History:   Procedure Laterality Date    ABDOMINAL SURGERY  2002    BARIATRIC SURGERY  2003    BREAST BIOPSY  2013    COLONOSCOPY  2022    D & C WITH SUCTION  1999    DILATATION AND CURETTAGE  1999    ENDOSCOPY  2022    GASTRIC BYPASS  2003    GASTRIC SLEEVE LAPAROSCOPIC  2003    HERNIA REPAIR  2012    HYSTEROSCOPY      OOPHORECTOMY Right 2004    OOPHORECTOMY  2021    OVARIAN CYST SURGERY      OVARY SURGERY Bilateral     right removed 2006  left removed 2021    UPPER GASTROINTESTINAL ENDOSCOPY      WISDOM TOOTH EXTRACTION         MEDICATIONS    Current Outpatient Medications:     buPROPion SR (WELLBUTRIN SR) 150 MG 12 hr tablet, Take 1 tablet by mouth Every 12 (Twelve) Hours., Disp: , Rfl:     Cyanocobalamin (B-12 Compliance Injection) 1000 MCG/ML kit, Inject 1 mL as directed Every 14 (Fourteen) Days., Disp: 1 kit, Rfl: 4    metFORMIN (GLUCOPHAGE) 1000 MG tablet, TAKE 1 TABLET DAILY WITH BREAKFAST (NEED APPOINTMENT FOR REFILLS), Disp: 90 tablet, Rfl: 3    multivitamin (THERAGRAN) tablet tablet, Take 1 tablet by mouth., Disp: , Rfl:     nystatin (MYCOSTATIN) 487875 UNIT/GM cream, Apply 1 Application topically to the appropriate area as directed., Disp: , Rfl:     Syringe 25G X 5/8\" 3 ML misc, USE ONE SYRINGE " WITH EACH B12 INJECTION MONTHLY, Disp: 3 each, Rfl: 1    Zepbound 10 MG/0.5ML solution auto-injector, Inject 0.5 mL under the skin into the appropriate area as directed Every 7 (Seven) Days., Disp: , Rfl:     Wegovy 2.4 MG/0.75ML solution auto-injector, Inject 2.4 mg under the skin into the appropriate area as directed Every 7 (Seven) Days. (Patient not taking: Reported on 1/27/2025), Disp: , Rfl:     ALLERGIES:   No Known Allergies    SOCIAL HISTORY:       Social History     Socioeconomic History    Marital status:      Spouse name: Galilea    Number of children: 2   Tobacco Use    Smoking status: Never    Smokeless tobacco: Never   Vaping Use    Vaping status: Never Used   Substance and Sexual Activity    Alcohol use: Not Currently     Comment: socially    Drug use: Never    Sexual activity: Yes     Partners: Female     Birth control/protection: Partner of same sex         FAMILY HISTORY:  Family History   Problem Relation Age of Onset    Hypertension Father     Diabetes Father     Heart disease Father     Pancreatic cancer Father     Sleep apnea Father     Heart disease Mother     Hypertension Mother     Hyperlipidemia Mother     Early death Brother     Lung cancer Brother     Diabetes Sister     Heart disease Sister         Heart attack 50s    Heart attack Sister     Heart attack Paternal Grandfather     Stroke Paternal Grandmother     Colon cancer Maternal Grandmother     Colon cancer Maternal Grandfather     Colon cancer Maternal Uncle     Ovarian cancer Other     Breast cancer Neg Hx     Uterine cancer Neg Hx        REVIEW OF SYSTEMS:  Review of Systems   Constitutional:  Negative for activity change.   HENT:  Negative for nosebleeds and trouble swallowing.    Respiratory:  Negative for shortness of breath and wheezing.    Cardiovascular:  Negative for chest pain and palpitations.   Gastrointestinal:  Negative for constipation, diarrhea and nausea.   Genitourinary:  Negative for dysuria and hematuria.  "  Musculoskeletal:  Negative for arthralgias and myalgias.   Skin:  Negative for rash and wound.   Neurological:  Negative for seizures and syncope.   Hematological:  Negative for adenopathy. Does not bruise/bleed easily.   Psychiatric/Behavioral:  Negative for confusion.               Vitals:    01/27/25 0925   BP: 114/80   Pulse: 84   Temp: 97.3 °F (36.3 °C)   TempSrc: Oral   SpO2: Comment: unable to read.  tried warming finger   Weight: 86.8 kg (191 lb 6.4 oz)   Height: 160 cm (62.99\")   PainSc: 0-No pain         1/27/2025     9:25 AM   Current Status   ECOG score 0      PHYSICAL EXAM:        CONSTITUTIONAL:  Vital signs reviewed.  No distress, looks comfortable.  EYES:  Conjunctiva and lids unremarkable.  PERRLA  EARS,NOSE,MOUTH,THROAT:  Ears and nose appear unremarkable.  Lips, teeth, gums appear unremarkable.  RESPIRATORY:  Normal respiratory effort.  Lungs clear to auscultation bilaterally.  CARDIOVASCULAR:  Normal S1, S2.  No murmurs rubs or gallops.  No significant lower extremity edema.  GASTROINTESTINAL: Abdomen appears unremarkable.  Nontender.  No hepatomegaly.  No splenomegaly.  LYMPHATIC:  No cervical, supraclavicular, axillary lymphadenopathy.  SKIN:  Warm.  No rashes.  PSYCHIATRIC:  Normal judgment and insight.  Normal mood and affect.      RECENT LABS:        WBC   Date Value Ref Range Status   01/20/2025 6.19 3.40 - 10.80 10*3/mm3 Final   07/29/2024 6.13 3.40 - 10.80 10*3/mm3 Final   12/18/2023 7.10 3.40 - 10.80 10*3/mm3 Final   05/03/2023 6.81 3.40 - 10.80 10*3/mm3 Final   11/02/2022 7.02 3.40 - 10.80 10*3/mm3 Final   09/19/2022 5.92 3.40 - 10.80 10*3/mm3 Final     Hemoglobin   Date Value Ref Range Status   01/20/2025 12.8 12.0 - 15.9 g/dL Final   07/29/2024 13.5 12.0 - 15.9 g/dL Final   12/18/2023 13.9 12.0 - 15.9 g/dL Final   05/03/2023 13.6 12.0 - 15.9 g/dL Final   11/02/2022 14.8 12.0 - 15.9 g/dL Final   09/19/2022 14.4 12.0 - 15.9 g/dL Final     Platelets   Date Value Ref Range Status "   01/20/2025 273 140 - 450 10*3/mm3 Final   07/29/2024 271 140 - 450 10*3/mm3 Final   12/18/2023 276 140 - 450 10*3/mm3 Final   05/03/2023 301 140 - 450 10*3/mm3 Final   11/02/2022 248 140 - 450 10*3/mm3 Final   09/19/2022 264 140 - 450 10*3/mm3 Final       Assessment & Plan   There are no diagnoses linked to this encounter.      Leatha Burkett   *Iron deficiency anemia  History of IV iron, which typically improves body cramps/aches/fatigue  11/2/2022: Transferred care to our practice after moving to [place] from [place].  Previously under the care of Dr. Marquise Huerta, hematology oncology at [facility] in [place], [place].  He reported she was needing IV iron on average annually  Cannot tolerate oral iron due to constipation and abdominal pain.  Also oral iron is not effective due to poor absorption (has not been effective in the past and she has gastric bypass, which is felt to decrease absorption)  9/19/2022: Ferritin 241, serum iron 104 (normal iron saturation).  Hb 14.4.  MCV 91.6.  No need for IV iron at this time.  5/3/2023: Ferritin 162, 32% saturation, Hb 13.6.  No need for IV iron at this time  12/18/2023: Ferritin 140, 25% saturation, Hb 13.9.  No need for IV iron.  7/29/2024: Ferritin 74, 20% saturation, Hb 13.5.  No need for IV iron  1/20/2025: Ferritin 77, 21% saturation, Hb 12.8.  No need for IV iron    *Source of iron deficiency  Gastric bypass 2003, [facility]  History of menorrhagia.  First ovary removed 2006.  Second ovary removed November 2021.  (Ovaries were removed due to cysts.)    No vaginal bleeding since then.  Patient's last EGD and colonoscopy April 2021.  She states she was told to have the next 5 years later.  5/10/2023: Patient states ongoing GERD despite omeprazole and Carafate.  She states last EGD showed ulcers.  She would like to see a GI in [place].  Last EGD was through [facility].    *B12 deficiency, since gastric bypass surgery.  Monthly IM B12 injections at  home  B12 819 on 9/19/2022.  B12 399 on 5/3/2023.  She reported she forgot to do the B12 monthly self injections and has not been on these for the past 6 months.  Encouraged to resume  12/18/2023: B12 178 (low).  She forgets to take the self IM injections.  Therefore, changed to B12 1000 mcg oral daily (1000 mcg IM injection x 1 in the office today).  5/21/24: Per patient request: Switched back to self IM B12 injections monthly  7/29/2024: B12 342.  She states her bariatric surgeon request keeping B12 >400.  Therefore, changed to  self IM B12 to every 2 weeks  1/20/2025: B12 416.  Continue B12 every 2 weeks    *Gastric bypass surgery, 2003 (lost 150 pounds).  RBC folate normal, 1170, on 5/3/2023    *Family history of colon cancer  Mom age 48, maternal grandmother age 45, maternal grandfather age 68.  Patient's last EGD and colonoscopy April 2021.  She states she was told to have the next 5 years later.  She declines genetic counseling but knows it is available    *Class 1 obesity.  Being overweight can lead to cytopenias through hepatic steatosis.  Body mass index is 33.91 kg/m².  BMI 25 to <30 is overweight  BMI 30 to <35 is class 1 obesity  BMI 35 to <40 is class 2 obesity  BMI 40 or higher is class 3 obesity   Remains overweight.  Ideally, lose weight    Plan  MD 1 year.  1 week prior: Ferritin, iron panel, CBC, reticulate hemoglobin, B12 level, RBC folate  (had also been getting folate checked).  Vitamin B12 1000 mcg IM every 2 weeks, self injects  (Patient states her bariatric surgeon prefers her B12 level >400)

## 2025-04-09 ENCOUNTER — APPOINTMENT (OUTPATIENT)
Dept: WOMENS IMAGING | Facility: HOSPITAL | Age: 45
End: 2025-04-09
Payer: COMMERCIAL

## 2025-04-09 PROCEDURE — 76642 ULTRASOUND BREAST LIMITED: CPT | Performed by: RADIOLOGY

## 2025-04-09 PROCEDURE — 77066 DX MAMMO INCL CAD BI: CPT | Performed by: RADIOLOGY

## 2025-04-09 PROCEDURE — 77062 BREAST TOMOSYNTHESIS BI: CPT | Performed by: RADIOLOGY

## 2025-04-09 PROCEDURE — G0279 TOMOSYNTHESIS, MAMMO: HCPCS | Performed by: RADIOLOGY

## 2025-04-23 NOTE — PROGRESS NOTES
Chief Complaint   Patient presents with    Follow-up     3 Month Follow Up To Review Result's from Bone scan and Mammogram. Patient has no concerns.         HPI  Leatha Burkett is a 44 y.o. female is scheduled for f/u office visit. She denies any vaginal bleeding or pain. She denies significant hot flashes. She notes she wishes to stay off HRT. She notes she did not feel well on estrogen in the past.         The following portions of the patient's history were reviewed and updated as appropriate: allergies, current medications, past family history, past medical history, past social history, past surgical history, and problem list.    Review of Systems  Pertinent items are noted in HPI.    /70 (BP Location: Right arm, Patient Position: Sitting, Cuff Size: Adult)   Wt 82.2 kg (181 lb 3.2 oz)   BMI 32.11 kg/m²         Physical Exam  Constitutional:       Appearance: Normal appearance.   Pulmonary:      Effort: Pulmonary effort is normal.   Neurological:      General: No focal deficit present.      Mental Status: She is alert and oriented to person, place, and time.   Psychiatric:         Mood and Affect: Mood normal.         Behavior: Behavior normal.       4/2025: bilateral diagnostic mammogram and right breast u/s: benign, return to screening 1 year      Diagnoses and all orders for this visit:    1. Category 3 mammography result with short follow-up interval suggested for probably benign finding (Primary)    2. Surgical menopause    3. Encounter for screening mammogram for malignant neoplasm of breast  -     Mammo Screening Digital Tomosynthesis Bilateral With CAD; Future        Ct 3 mammogram: reviewed most recent imaging that is benign. She will return to screening mammogram next year.     Surgical menopause: pt declines HRT. F/u bone density results. Recommend she consume the recommended allowance of calcium and vit D and continue wt bearing exercise.

## 2025-04-25 ENCOUNTER — OFFICE VISIT (OUTPATIENT)
Dept: OBSTETRICS AND GYNECOLOGY | Age: 45
End: 2025-04-25
Payer: COMMERCIAL

## 2025-04-25 ENCOUNTER — HOSPITAL ENCOUNTER (OUTPATIENT)
Facility: HOSPITAL | Age: 45
Discharge: HOME | End: 2025-04-25
Admitting: OBSTETRICS & GYNECOLOGY
Payer: COMMERCIAL

## 2025-04-25 VITALS — SYSTOLIC BLOOD PRESSURE: 112 MMHG | BODY MASS INDEX: 32.11 KG/M2 | DIASTOLIC BLOOD PRESSURE: 70 MMHG | WEIGHT: 181.2 LBS

## 2025-04-25 DIAGNOSIS — E89.40 SURGICAL MENOPAUSE: ICD-10-CM

## 2025-04-25 DIAGNOSIS — R92.8 CATEGORY 3 MAMMOGRAPHY RESULT WITH SHORT FOLLOW-UP INTERVAL SUGGESTED FOR PROBABLY BENIGN FINDING: Primary | ICD-10-CM

## 2025-04-25 DIAGNOSIS — Z12.31 ENCOUNTER FOR SCREENING MAMMOGRAM FOR MALIGNANT NEOPLASM OF BREAST: ICD-10-CM

## 2025-04-25 PROCEDURE — 77080 DXA BONE DENSITY AXIAL: CPT

## 2025-05-15 ENCOUNTER — TELEPHONE (OUTPATIENT)
Dept: OBSTETRICS AND GYNECOLOGY | Age: 45
End: 2025-05-15
Payer: COMMERCIAL

## 2025-05-15 NOTE — TELEPHONE ENCOUNTER
Pt called asking if you could re-prescribe her the Estradiol cream again. Stated she would like to go back on it.

## 2025-05-16 RX ORDER — ESTRADIOL 0.1 MG/G
CREAM VAGINAL
Qty: 42 G | Refills: 3 | Status: SHIPPED | OUTPATIENT
Start: 2025-05-16

## 2025-06-02 RX ORDER — CYANOCOBALAMIN 1000 UG/ML
INJECTION, SOLUTION INTRAMUSCULAR; SUBCUTANEOUS
Qty: 1 ML | Refills: 25 | Status: SHIPPED | OUTPATIENT
Start: 2025-06-02

## 2025-07-02 ENCOUNTER — OFFICE VISIT (OUTPATIENT)
Dept: OBSTETRICS AND GYNECOLOGY | Age: 45
End: 2025-07-02
Payer: COMMERCIAL

## 2025-07-02 VITALS — DIASTOLIC BLOOD PRESSURE: 82 MMHG | SYSTOLIC BLOOD PRESSURE: 122 MMHG | BODY MASS INDEX: 30.94 KG/M2 | WEIGHT: 174.6 LBS

## 2025-07-02 DIAGNOSIS — N64.4 BREAST PAIN, RIGHT: Primary | ICD-10-CM

## 2025-07-02 NOTE — PROGRESS NOTES
Chief Complaint   Patient presents with    Follow-up     Patient here today for right breast pain, it has been ongoing for about 1 week.         HPI  Leatha Burkett is a 44 y.o. female is scheduled for f/u gyn visit. Pt complains of right breast pain at 9:00 for about a week. She reports the area feels sore. This is not the same area that was imaged in April. She notes that was under the breast. She denies skin change currently but that there was some burning over the skin in this area last week. She denies nipple discharge or lumps.        The following portions of the patient's history were reviewed and updated as appropriate: allergies, current medications, past family history, past medical history, past social history, past surgical history, and problem list.    Review of Systems  Pertinent items are noted in HPI.    /82 (BP Location: Right arm, Patient Position: Sitting, Cuff Size: Adult)   Wt 79.2 kg (174 lb 9.6 oz)   BMI 30.94 kg/m²         Physical Exam  Constitutional:       Appearance: Normal appearance.   Pulmonary:      Effort: Pulmonary effort is normal.   Neurological:      Mental Status: She is alert.   Psychiatric:         Mood and Affect: Mood normal.         Behavior: Behavior normal.       Inspection is negative. Examined in sitting and supine positions. Left breast is without masses, retractions, nipple discharge or axillary adenopathy. Right breast is without masses, retractions, nipple discharge or axillary adenopathy.        Diagnoses and all orders for this visit:    1. Breast pain, right (Primary)  -     Mammo Diagnostic Digital Tomosynthesis Right With CAD; Future  -     US Breast Right Limited; Future  -     Ambulatory Referral to Breast Surgery      Discussed ways to address breast pain. Pt already limits caffeine and wears support bras without underwire. Plan imaging since location is different from the site imaged in April and pt agrees. Recommend consult with breast surgery as  pain is recurrent. Pt notes agreement. Plan f/u 8 wks or prn.

## 2025-07-15 ENCOUNTER — APPOINTMENT (OUTPATIENT)
Dept: WOMENS IMAGING | Facility: HOSPITAL | Age: 45
End: 2025-07-15
Payer: COMMERCIAL

## 2025-07-15 PROCEDURE — 77065 DX MAMMO INCL CAD UNI: CPT | Performed by: RADIOLOGY

## 2025-07-15 PROCEDURE — 76642 ULTRASOUND BREAST LIMITED: CPT | Performed by: RADIOLOGY

## 2025-07-15 PROCEDURE — 77061 BREAST TOMOSYNTHESIS UNI: CPT | Performed by: RADIOLOGY

## 2025-07-15 PROCEDURE — G0279 TOMOSYNTHESIS, MAMMO: HCPCS | Performed by: RADIOLOGY
